# Patient Record
Sex: MALE | NOT HISPANIC OR LATINO | URBAN - METROPOLITAN AREA
[De-identification: names, ages, dates, MRNs, and addresses within clinical notes are randomized per-mention and may not be internally consistent; named-entity substitution may affect disease eponyms.]

---

## 2019-06-21 ENCOUNTER — OFFICE VISIT CONVERTED (OUTPATIENT)
Dept: UROLOGY | Facility: CLINIC | Age: 49
End: 2019-06-21
Attending: NURSE PRACTITIONER

## 2019-06-21 ENCOUNTER — CONVERSION ENCOUNTER (OUTPATIENT)
Dept: UROLOGY | Facility: CLINIC | Age: 49
End: 2019-06-21

## 2019-06-21 ENCOUNTER — HOSPITAL ENCOUNTER (OUTPATIENT)
Dept: UROLOGY | Facility: CLINIC | Age: 49
Discharge: HOME | End: 2019-06-21
Attending: NURSE PRACTITIONER

## 2019-06-22 LAB
C TRACH RRNA CVX QL NAA+PROBE: NOT DETECTED
N GONORRHOEA DNA SPEC QL NAA+PROBE: NOT DETECTED

## 2019-07-08 ENCOUNTER — OFFICE VISIT CONVERTED (OUTPATIENT)
Dept: UROLOGY | Facility: CLINIC | Age: 49
End: 2019-07-08
Attending: NURSE PRACTITIONER

## 2019-07-08 ENCOUNTER — CONVERSION ENCOUNTER (OUTPATIENT)
Dept: UROLOGY | Facility: CLINIC | Age: 49
End: 2019-07-08

## 2019-07-31 ENCOUNTER — OFFICE VISIT CONVERTED (OUTPATIENT)
Dept: UROLOGY | Facility: CLINIC | Age: 49
End: 2019-07-31
Attending: UROLOGY

## 2020-04-20 ENCOUNTER — TELEMEDICINE CONVERTED (OUTPATIENT)
Dept: NEUROSURGERY | Facility: CLINIC | Age: 50
End: 2020-04-20
Attending: PHYSICIAN ASSISTANT

## 2020-05-27 ENCOUNTER — HOSPITAL ENCOUNTER (OUTPATIENT)
Dept: GENERAL RADIOLOGY | Facility: HOSPITAL | Age: 50
Discharge: HOME | End: 2020-05-27
Attending: PHYSICIAN ASSISTANT

## 2020-06-01 ENCOUNTER — TELEMEDICINE CONVERTED (OUTPATIENT)
Dept: NEUROSURGERY | Facility: CLINIC | Age: 50
End: 2020-06-01
Attending: PHYSICIAN ASSISTANT

## 2020-07-09 ENCOUNTER — OFFICE VISIT CONVERTED (OUTPATIENT)
Dept: NEUROSURGERY | Facility: CLINIC | Age: 50
End: 2020-07-09
Attending: NEUROLOGICAL SURGERY

## 2020-12-28 ENCOUNTER — OFFICE VISIT CONVERTED (OUTPATIENT)
Dept: UROLOGY | Facility: CLINIC | Age: 50
End: 2020-12-28
Attending: UROLOGY

## 2020-12-28 ENCOUNTER — HOSPITAL ENCOUNTER (OUTPATIENT)
Dept: GENERAL RADIOLOGY | Facility: HOSPITAL | Age: 50
Discharge: HOME | End: 2020-12-28
Attending: UROLOGY

## 2021-02-05 ENCOUNTER — HOSPITAL ENCOUNTER (OUTPATIENT)
Dept: GENERAL RADIOLOGY | Facility: HOSPITAL | Age: 51
Discharge: HOME | End: 2021-02-05
Attending: UROLOGY

## 2021-02-09 ENCOUNTER — TELEPHONE CONVERTED (OUTPATIENT)
Dept: UROLOGY | Facility: CLINIC | Age: 51
End: 2021-02-09
Attending: UROLOGY

## 2021-02-24 ENCOUNTER — HOSPITAL ENCOUNTER (OUTPATIENT)
Dept: GENERAL RADIOLOGY | Facility: HOSPITAL | Age: 51
Discharge: HOME | End: 2021-02-24
Attending: UROLOGY

## 2021-03-02 ENCOUNTER — TELEPHONE CONVERTED (OUTPATIENT)
Dept: UROLOGY | Facility: CLINIC | Age: 51
End: 2021-03-02
Attending: UROLOGY

## 2021-05-10 NOTE — H&P
History and Physical      Patient Name: Chago Riddle   Patient ID: [id number]   Sex: Male   YOB: 1970    Primary Care Provider: Armand Loomis MD   Referring Provider: Armand Loomis MD    Visit Date: April 20, 2020    Provider: Sandy Richards PA-C   Location: [facility]   Location Address: [address] [place] [address]  [id number]   Location Phone: [phone]          Chief Complaint  · Back pain      History Of Present Illness  Video Conferencing Visit  Chago Riddle is a 49 year old male who is presenting for evaluation via video conferencing. Verbal consent obtained before beginning visit.   The following staff were present during this visit: Ayala Wisdom MA   HOW IS THE PATIENT PRESENTING TODAY of low back pain, right leg pain, and paresthesias in the left leg.   The right leg pain involves the right posterior thigh in a nonspecific distribution and into groin/testicular area. The pain developed gradually about one year ago. It is 5/10 in severity has an aching, a dull, a sharp, a pressure-like, and a throbbing quality and radiates into the right hip, groin distribution. The pain has been waxing and waning in severity. The pain tends to be maximal at no specific time, but waxes and wanes in severity throughout the day. The patient states the pain is aggravated by walking long distances and lying on side. It is alleviated by rest, heat, and squatting, applying pressure to back. The paresthesias involve the left S1 distribution.   He denies bowel or bladder dysfunction. The patient's past medical history is notable for prior lumbar spine surgery. He underwent a discectomy 6 years ago by Dr. Rod.   RECENT INTERVENTIONS:  He has been previously treated with NSAIDs and bedrest.   INFORMATION REVIEWED:  The following information was reviewed: radiology reports and images. CT of the lumbar spine revealed grade I spondylolisthesis and degenerative disk disease. The grade  I spondylolisthesis involves the L5 vertebral body and with bilateral pars defects at L5.. The degenerative disc disease is present at multiple levels.       Past Medical History  Degenerative Disc Disease ; Herniated Disc; Lumbago; Testicular pain; Wrist injury         Past Surgical History  Appendectomy; Back surgery; [place] Tooth Extraction; Wrist Surgery         Allergy List  NO KNOWN DRUG ALLERGIES         Family Medical History  Heart Disease; Cancer, Unspecified; Brain tumor         Social History  Alcohol (Never); Tobacco (Current every day)         Review of Systems  · Constitutional  o Denies  o : chills, excessive sweating, fatigue, fever, sycope/passing out, weight gain, weight loss  · Eyes  o Denies  o : changes in vision, blurry vision, double vision  · HENT  o Denies  o : loss of hearing, ringing in the ears, ear aches, sore throat, nasal congestion, sinus pain, nose bleeds, seasonal allergies  · Cardiovascular  o Denies  o : blood clots, swollen legs, anemia, easy burising or bleeding, transfusions  · Respiratory  o Denies  o : shortness of breath, dry cough, productive cough, pneumonia, COPD  · Gastrointestinal  o Denies  o : difficulty swallowing, reflux  · Genitourinary  o Denies  o : incontinence  · Neurologic  o Denies  o : headache, seizure, stroke, tremor, loss of balance, falls, dizziness/vertigo, difficulty with sleep, numbness/tingling/paresthesia , difficulty with coordination, difficulty with dexterity, weakness  · Musculoskeletal  o Admits  o : low back pain  o Denies  o : neck stiffness/pain, swollen lymph nodes, muscle aches, joint pain, weakness, spasms, sciatica, pain radiating in arm, pain radiating in leg  · Endocrine  o Denies  o : diabetes, thyroid disorder  · Psychiatric  o Denies  o : anxiety, depression      Physical Examination  · Constitutional  o Appearance  o : well-nourished, well developed, alert, in no acute distress  · Respiratory  o Respiratory Effort  o : breathing  unlabored  · Cardiovascular  o Peripheral Vascular System  o :   § Extremities  § : no edema or cyanosis  · Neurologic  o Mental Status Examination  o :   § Orientation  § : alert and oriented to time, person, place and events  o Reflexes  o : DTRs 2 bilaterally  o Gait and Station  o :   § Gait Screening  § : limping gait. Able to stand without difficulty.   · Psychiatric  o Mood and Affect  o : mood normal, affect appropriate     Straight leg raise negative bilaterally.               Assessment  · Lumbago/low back pain     724.3/M54.40  · Sciatica     724.3/M54.30  · Spondylolisthesis , lumbar     738.4/M43.16    Problems Reconciled  Plan  · Orders  o ACO-17: Screened for tobacco use AND received tobacco cessation intervention (4004F) - - 04/20/2020  o MRI lumbar spine w/w/o contrst (50177) - 724.3/M54.30, 724.3/M54.40, 738.4/M43.16 - 04/20/2020  o X-ray of lumbar spine, two or three bending views (19435) - 724.3/M54.30, 724.3/M54.40, 738.4/M43.16 - 04/20/2020  · Medications  o Medications have been Reconciled  o Transition of Care or Provider Policy  · Instructions  o Tobacco Cessation Counseling: Encouraged to stop smoking.   o Encouraged to follow-up with Primary Care Provider for preventative care.  o The ROS and the PFSH were reviewed at today's visit.  o I have discussed the risks and benefits of surgery versus physical therapy, epidural steroids, and other conservative forms of treatment.  o Handouts provided: Non-Surgical Treatments for Back Pain/Degenerative Disc Disease.  o We have discussed the benefits of core strengthening. Patient will work on improving the core muscle strength with low impact activities such as walking, swimming, Pilates, etc.   o Call or return to office if symptoms worsen or persist.  o Will order MRI of Lspine and bending xray of Lspine and return afterwards.   o Electronically Identified Patient Education Materials Provided Electronically  · Disposition  o Call or Return if  symptoms worsen or persist.            Electronically Signed by: Sandy Richards PA-C -Author on April 20, 2020 02:25:46 PM

## 2021-05-10 NOTE — H&P
History and Physical      Patient Name: Chago Riddle   Patient ID: [id number]   Sex: Male   YOB: 1970    Primary Care Provider: Armand Loomis MD   Referring Provider: Armand Loomis MD    Visit Date: December 28, 2020    Provider: Andres Hamilton MD   Location: [facility]   Location Address: [address] [place] [address]  [id number]   Location Phone: [phone]          Chief Complaint  · pt is having urological issues      History Of Present Illness     50-year-old gentleman with a left 5 mm ureteral stone chronic right testicular/groin pain    Has been straining his urine. Has not a stone.  Patient has not had pain for 2 days.  Patient has not had severe pain for several days.  Makes the pain better or worse.  Pain is sharp in nature.    12/28/20 negative    Patient has had some right-sided testicular pain for the last year. Bothersome.  HAs has a lot of lumbar back pain.  [facility] Hard to [facility] sometimes.    12/20 CT abdomen/pelvis without5 mm UVJ stone, very distal on the left.  No other stones in the left.  4 mm nonobstructing stone the right.  No other findings.    1st stone           Past Medical History  Degenerative Disc Disease ; Herniated Disc; Kidney stone; Lumbago; Testicular pain; Wrist injury         Past Surgical History  Appendectomy; Back surgery; [place] Tooth Extraction; Wrist Surgery         Allergy List  NO KNOWN DRUG ALLERGIES         Family Medical History  Heart Disease; Cancer, Unspecified; Brain tumor         Social History  Alcohol (Never); Tobacco (Current every day)         Review of Systems  · Constitutional  o Denies  o : fever, headache, chills  · Eyes  o Denies  o : eye pain, double vision, blurred vision  · HENT  o Denies  o : sinus problems, sore throat, ear infection  · Cardiovascular  o Denies  o : chest pain, high blood pressure, varicosities  · Respiratory  o Denies  o : shortness of breath,  "wheezing, frequent cough  · Gastrointestinal  o Denies  o : nausea, vomiting, heartburn, indigestion, abdominal pain  · Genitourinary  o Admits  o : frequency  o Denies  o : urgency, urinary retention, painful urination  · Integument  o Denies  o : rash, itching, boils  · Neurologic  o Denies  o : tingling or numbness, tremors, dizzy spells  · Musculoskeletal  o Denies  o : joint pain, neck pain, back pain  · Endocrine  o Denies  o : cold intolerance, heat intolerance, tired, excessive thirst, sluggish  · Psychiatric  o Admits  o : feels satisfied with life  o Denies  o : severe depression, concerns with hurting themselves  · Heme-Lymph  o Denies  o : swollen glands, blood clotting problems  · Allergic-Immunologic  o Denies  o : sinus allergy symptoms, hay fever      Vitals  Date Time BP Position Site L\R Cuff Size HR RR TEMP (F) WT  HT  BMI kg/m2 BSA m2 O2 Sat FR L/min FiO2        12/28/2020 11:31 AM       13  185lbs 16oz 5'  10\" 26.69 2.04             Physical Examination  · Constitutional  o Appearance  o : Well-appearing, well-developed, in no acute distress  · Respiratory  o Respiratory Effort  o : Unlabored breathing  · Cardiovascular  o Heart  o :   § Auscultation of Heart  § : Regular rate and rhythm, no murmurs  · Gastrointestinal  o Abdominal Examination  o : Nontender, nondistended, no rigidity or guarding, no hepatosplenomegaly  · Genitourinary  o Bladder  o : nonpalpable  o Penis  o : circumcised phallus with no masses or lesions  o Urethral Meatus  o : no inflammation present and no stenosis  o Scrotum and Scrotal Contents  o :   § Testes  § : Bilateral descended testicles no masses or lesions  · Neurologic  o Mental Status Examination  o :   § Orientation  § : Grossly oriented to person, place and time, judgment and insight intact, normal mood and affect              Assessment  · Nephrolithiasis     592.0/N20.0  · Testicular pain, right     608.9/N50.811      Plan  · Orders  o Renal ultrasound " (20154, 55190) - 592.0/N20.0 - 02/05/2021 2-5-21 AT 10:45 AT [facility] - COOL SRINGS - HAVE FULL BLADDER  · Medications  o Medications have been Reconciled  o Transition of Care or Provider Policy  · Instructions  o Electronically Identified Patient Education Materials Provided Electronically       Ureteral stone    Has not had pain for a few days.  At this time we shorty continue to try to let him pass his stones if it still there.    Follow-up in 1 month with renal ultrasound, patient understands if he gets fever greater than 100.5, intractable nausea/vomiting or tract pain he should go back to the ER      Right testicular pain    Currently being worked up by neurosurgery.  We discussed that this pain is continuing to bother him after this we could discuss further interventions.  We did discuss NSAIDs or antibiotics.  At this time he is not interested.  PCP has already tried some forms of treatment.    Follow-up in 1 month with renal ultrasound    Greater than 15 minutes was used in counseling and coordination of care, with greater than 51% of this in face-to-face counseling             Electronically Signed by: Andres Hamilton MD -Author on December 28, 2020 12:08:17 PM

## 2021-05-13 NOTE — PROGRESS NOTES
Progress Note      Patient Name: Chago Riddle   Patient ID: [id number]   Sex: Male   YOB: 1970    Primary Care Provider: Armand Loomis MD   Referring Provider: Armand Loomis MD    Visit Date: July 9, 2020    Provider: Burak Rod MD   Location: [facility]   Location Address: [address] [place] [address]  [id number]   Location Phone: [phone]          Chief Complaint  · Follow-up     Here with complaints of right hip pain radiating to right groin. Did not complete xrays ordered by Sugey at last visit.       History Of Present Illness  The patient is a 49 year old male who is in the office for followup appointment. His worst pain is in the right hip and testicle. He has had his testicle evaluated and has had an MRI of the lumbar spine. He has not yet had flex-ext or hip x-rays.       Past Medical History  Degenerative Disc Disease ; Herniated Disc; Lumbago; Testicular pain; Wrist injury         Past Surgical History  Appendectomy; Back surgery; [place] Tooth Extraction; Wrist Surgery         Allergy List  NO KNOWN DRUG ALLERGIES       Allergies Reconciled  Family Medical History  Heart Disease; Cancer, Unspecified; Brain tumor         Social History  Alcohol (Never); Tobacco (Current every day)         Review of Systems  · Constitutional  o Admits  o : weight gain  o Denies  o : chills, excessive sweating, fatigue, fever, sycope/passing out, weight loss  · Eyes  o Admits  o : changes in vision, blurry vision  o Denies  o : double vision  · HENT  o Admits  o : seasonal allergies  o Denies  o : loss of hearing, ringing in the ears, ear aches, sore throat, nasal congestion, sinus pain, nose bleeds  · Cardiovascular  o Denies  o : blood clots, swollen legs, anemia, easy burising or bleeding, transfusions  · Respiratory  o Denies  o : shortness of breath, dry cough, productive cough, pneumonia, COPD  · Gastrointestinal  o Denies  o : difficulty swallowing,  "reflux  · Genitourinary  o Denies  o : incontinence  · Neurologic  o Admits  o : difficulty with sleep, numbness/tingling/paresthesia   o Denies  o : headache, seizure, stroke, tremor, loss of balance, falls, dizziness/vertigo, difficulty with coordination, difficulty with dexterity, weakness  · Musculoskeletal  o Admits  o : muscle aches, joint pain, spasms, pain radiating in leg, low back pain  o Denies  o : neck stiffness/pain, swollen lymph nodes, weakness, sciatica, pain radiating in arm  · Endocrine  o Denies  o : diabetes, thyroid disorder  · Psychiatric  o Denies  o : anxiety, depression  · All Others Negative      Vitals  Date Time BP Position Site L\R Cuff Size HR RR TEMP (F) WT  HT  BMI kg/m2 BSA m2 O2 Sat HC       07/09/2020 11:28 AM        97.1 186lbs 8oz 5'  10\" 26.76 2.04           Physical Examination  · Constitutional  o Appearance  o : well-nourished, well developed, alert, in no acute distress  · Respiratory  o Respiratory Effort  o : breathing unlabored  · Cardiovascular  o Peripheral Vascular System  o :   § Extremities  § : no cyanosis, clubbing or edema  · Psychiatric  o Mood and Affect  o : mood normal, affect appropriate     He has mild pain with Ghulam's testing.               Assessment  · Right groin pain     789.03/R10.31  We will r/o hip as cause  · Spondylolisthesis at L5-S1 level     756.12/M43.17  Chronic  · Lumbar spondylosis     721.3/M47.816  L3-4 lateral recess stenosis on the right    Problems Reconciled  Plan  · Orders  o X-ray of lumbar spine, two or three bending views (39792) - - 07/09/2020  o Hip xray right 2 or more views (includes AP Pelvis) [facility]. (10829) - - 07/09/2020  · Medications  o Medications have been Reconciled  o Transition of Care or Provider Policy  · Instructions  o He will get his flex-extension x-rays as well as right hip x-rays. If no clear cause, we will consider a right L3-4 lateral recess decompression (redo from previous " discectomy).            Electronically Signed by: Burak Rod MD -Author on July 9, 2020 12:39:21 PM

## 2021-05-13 NOTE — PROGRESS NOTES
Progress Note      Patient Name: Chago Riddle   Patient ID: [id number]   Sex: Male   YOB: 1970    Primary Care Provider: Armand Loomis MD   Referring Provider: Armand Loomis MD    Visit Date: June 1, 2020    Provider: Sandy Richards PA-C   Location: [facility]   Location Address: [address] [place] [address]  [id number]   Location Phone: [phone]          Chief Complaint     Patient is doing a telehealth visit to discuss MRI results.       History Of Present Illness  The patient is a 49 year old male who is in the office for followup appointment. MRI of Lspine showed grade 1 spondylolisthesis with pars defects at L5. New right L3/4 disc osteophyte. Laminectomy defect at L3/4. He continues to have right groin pain and left leg numbness. He notes that his symptoms have worsened over the past year. He has failed LESIs. This was a video conference on zoom.       Past Medical History  Degenerative Disc Disease ; Herniated Disc; Lumbago; Testicular pain; Wrist injury         Past Surgical History  Appendectomy; Back surgery; [place] Tooth Extraction; Wrist Surgery         Allergy List  NO KNOWN DRUG ALLERGIES         Family Medical History  Heart Disease; Cancer, Unspecified; Brain tumor         Social History  Alcohol (Never); Tobacco (Current every day)         Review of Systems  · Constitutional  o Denies  o : chills, excessive sweating, fatigue, fever, sycope/passing out, weight gain, weight loss  · Eyes  o Denies  o : changes in vision, blurry vision, double vision  · HENT  o Denies  o : loss of hearing, ringing in the ears, ear aches, sore throat, nasal congestion, sinus pain, nose bleeds, seasonal allergies  · Cardiovascular  o Denies  o : blood clots, swollen legs, anemia, easy burising or bleeding, transfusions  · Respiratory  o Denies  o : shortness of breath, dry cough, productive cough, pneumonia, COPD  · Gastrointestinal  o Denies  o : difficulty swallowing,  reflux  · Genitourinary  o Denies  o : incontinence  · Neurologic  o Denies  o : headache, seizure, stroke, tremor, loss of balance, falls, dizziness/vertigo, difficulty with sleep, numbness/tingling/paresthesia , difficulty with coordination, difficulty with dexterity, weakness  · Musculoskeletal  o Admits  o : low back pain  o Denies  o : neck stiffness/pain, swollen lymph nodes, muscle aches, joint pain, weakness, spasms, sciatica, pain radiating in arm, pain radiating in leg  · Endocrine  o Denies  o : diabetes, thyroid disorder  · Psychiatric  o Denies  o : anxiety, depression      Physical Examination  · Constitutional  o Appearance  o : well-nourished, well developed, alert, in no acute distress  · Respiratory  o Respiratory Effort  o : breathing unlabored  · Cardiovascular  o Peripheral Vascular System  o :   § Extremities  § : no edema or cyanosis  · Neurologic  o Mental Status Examination  o :   § Orientation  § : alert and oriented to time, person, place and events  o Reflexes  o : DTRs 2 bilaterally  o Gait and Station  o :   § Gait Screening  § : limping gait. Able to stand without difficulty.   · Psychiatric  o Mood and Affect  o : mood normal, affect appropriate     Straight leg raise negative bilaterally. Pain with bending forward and backwards.               Assessment  · Lumbago/low back pain     724.3/M54.40  · Paresthesia     782.0/R20.2  · Sciatica     724.3/M54.30  · Spondylolisthesis , lumbar     738.4/M43.16      Plan  · Orders  o X-ray of lumbar spine, two or three bending views (32235) - 724.3/M54.30, 724.3/M54.40, 738.4/M43.16 - 06/01/2020  · Medications  o Medications have been Reconciled  o Transition of Care or Provider Policy  · Instructions  o The ROS and the PFSH were reviewed at today's visit.  o Call or return to office if symptoms worsen or persist.   o Has had previous surgery with Dr. Rod. Having worsening pain. Will order bending xray and return to discuss options. Has  spondy at L5 with pars defects.   o Electronically Identified Patient Education Materials Provided Electronically  · Disposition  o Call or Return if symptoms worsen or persist.            Electronically Signed by: Sandy Richards PA-C -Author on June 8, 2020 02:40:30 PM

## 2021-05-14 VITALS — WEIGHT: 186 LBS | RESPIRATION RATE: 13 BRPM | HEIGHT: 70 IN | BODY MASS INDEX: 26.63 KG/M2

## 2021-05-14 NOTE — PROGRESS NOTES
Progress Note      Patient Name: Chago Riddle   Patient ID: [id number]   Sex: Male   YOB: 1970    Primary Care Provider: Armand Loomis MD   Referring Provider: Armand Loomis MD    Visit Date: March 2, 2021    Provider: Andres Hamilton MD   Location: [facility]   Location Address: [address] [place] [address]  [id number]   Location Phone: [phone]          Chief Complaint  · urological issues      History Of Present Illness  TELEHEALTH TELEPHONE VISIT  Chago Riddle is a 50 year old /White male who is presenting for evaluation via telehealth telephone visit. Verbal consent obtained before beginning visit.   Provider spent 13 minutes with the patient during the telehealth visit.   The following staff were present during this visit: SOFIA Contreras   Past Medical History/ Overview of Patient Symptoms     50-year-old gentleman with h/o a left 5 mm ureteral stone and chronic right testicular/groin pain    f/u today with pain.    No L flank pain.  Pain in right scrotum/testicle. Chronic for years.  Pain also in R buttocks also at times, very bothersome.     f/u today with CT    2/21  CT A/P -2 mm stone in the right kidney, no other stones.  2 cm long segment of the ascending colon with circumferential wall thickening    No narcotics    Previous    2/21 renal ultrasound -mild right-sided pelviectasis, L side neg    Patient has had some right-sided testicular pain for the last year. Bothersome.  HAs has a lot of lumbar back pain.  Dr Rod has followed, being worked up cur[facility]. Hard to walk sometimes.    12/20 CT abdomen/pelvis without - 5 mm UVJ stone, very distal on the left.  No other stones in the left.  4 mm nonobstructing stone the right.  No other findings.    1st stone           Past Medical History  Degenerative Disc Disease ; Herniated Disc; Kidney stone; Lumbago; Testicular pain; Wrist injury         Past Surgical History  Appendectomy; Back surgery; [place]  Tooth Extraction; Wrist Surgery         Allergy List  NO KNOWN DRUG ALLERGIES         Family Medical History  Heart Disease; Cancer, Unspecified; Brain tumor         Social History  Alcohol (Never); Tobacco (Current every day)         Review of Systems  · Constitutional  o Denies  o : fatigue, night sweats  · Eyes  o Denies  o : double vision, blurred vision  · HENT  o Denies  o : vertigo, recent head injury  · Breasts  o Denies  o : abnormal changes in breast size, additional breast symptoms except as noted in the HPI  · Cardiovascular  o Denies  o : chest pain, irregular heart beats  · Respiratory  o Denies  o : shortness of breath, productive cough  · Gastrointestinal  o Denies  o : nausea, vomiting  · Genitourinary  o Denies  o : dysuria, urinary retention  · Integument  o Denies  o : hair growth change, new skin lesions  · Neurologic  o Denies  o : altered mental status, seizures  · Musculoskeletal  o Denies  o : joint swelling, limitation of motion  · Endocrine  o Denies  o : cold intolerance, heat intolerance  · Heme-Lymph  o Denies  o : petechiae, lymph node enlargement or tenderness  · Allergic-Immunologic  o Denies  o : frequent illnesses          Assessment  · Nephrolithiasis     592.0/N20.0  · Testicular pain, right     608.9/N50.811      Plan  · Orders  o Physician Telephone Evaluation, 11-20 minutes (38046) - 592.0/N20.0, 608.9/N50.811 - 03/02/2021  · Medications  o Medications have been Reconciled  o Transition of Care or Provider Policy  · Instructions  o Electronically Identified Patient Education Materials Provided Electronically     Patient has passed his stone, no pain on the left any longer    Abnormal area ascending colon    I did recommend because patient is 49 has this area abnormal on CT he get a colonoscopy.  We did discuss failure to do this could risk is a malignancy which could be detrimental to his health or cause death.  I will get him set up to see general surgery    Right scrotal  pain    I did discuss with patient considering spermatic cord block to see if this helps  - after discussion of risk /benefits patient does not want this at this time.  Is going to follow-up with Dr. oRd and see if he needs back surgery       Patient will follow me as needed.             Electronically Signed by: Andres Hamilton MD -Author on March 2, 2021 05:20:16 PM

## 2021-05-14 NOTE — PROGRESS NOTES
Progress Note      Patient Name: Chago Riddle   Patient ID: [id number]   Sex: Male   YOB: 1970    Primary Care Provider: Armand Loomis MD   Referring Provider: Armand Loomis MD    Visit Date: February 9, 2021    Provider: Andres Hamilton MD   Location: [facility]   Location Address: [address] [place] [address]  [id number]   Location Phone: [phone]          Chief Complaint  · urological issues      History Of Present Illness  TELEHEALTH TELEPHONE VISIT  Chago Riddle is a 50 year old /White male who is presenting for evaluation via telehealth telephone visit. Verbal consent obtained before beginning visit.   Provider spent 12 minutes with the patient during the telehealth visit.   The following staff were present during this visit: SOFIA Contreras   Past Medical History/ Overview of Patient Symptoms     50-year-old gentleman with h/o a left 5 mm ureteral stone and chronic right testicular/groin pain    2/21 renal ultrasound -mild right-sided pelviectasis, L side neg    No pain on the L, No GH. Pt still with chronic pain on the right scrotum/testicle. All the left pain is gone    No f/c.    Previous    Patient has had some right-sided testicular pain for the last year. Bothersome.  HAs has a lot of lumbar back pain.  Dr Rod has followed, being worked up currenlty. Hard to walk sometimes.    12/20 CT abdomen/pelvis without - 5 mm UVJ stone, very distal on the left.  No other stones in the left.  4 mm nonobstructing stone the right.  No other findings.    1st stone           Past Medical History  Degenerative Disc Disease ; Herniated Disc; Kidney stone; Lumbago; Testicular pain; Wrist injury         Past Surgical History  Appendectomy; Back surgery; Upper Tract Tooth Extraction; Wrist Surgery         Allergy List  NO KNOWN DRUG ALLERGIES         Family Medical History  Heart Disease; Cancer, Unspecified; Brain tumor         Social History  Alcohol (Never); Tobacco  (Current every day)         Review of Systems  · Constitutional  o Denies  o : fatigue, night sweats  · Eyes  o Denies  o : double vision, blurred vision  · HENT  o Denies  o : vertigo, recent head injury  · Breasts  o Denies  o : abnormal changes in breast size, additional breast symptoms except as noted in the HPI  · Cardiovascular  o Denies  o : chest pain, irregular heart beats  · Respiratory  o Denies  o : shortness of breath, productive cough  · Gastrointestinal  o Denies  o : nausea, vomiting  · Genitourinary  o Admits  o : frequency, scrotal pain  o Denies  o : dysuria, urinary retention  · Integument  o Denies  o : hair growth change, new skin lesions  · Neurologic  o Denies  o : altered mental status, seizures  · Musculoskeletal  o Denies  o : joint swelling, limitation of motion  · Endocrine  o Denies  o : cold intolerance, heat intolerance  · Heme-Lymph  o Denies  o : petechiae, lymph node enlargement or tenderness  · Allergic-Immunologic  o Denies  o : frequent illnesses          Assessment  · Nephrolithiasis     592.0/N20.0  · Testicular pain, right     608.9/N50.811      Plan  · Orders  o Physician Telephone Evaluation, 11-20 minutes (81582) - 592.0/N20.0, 608.9/N50.811 - 02/09/2021  o CT Abdomen and Pelvis without IV Contrast [facility]; suggest oral prep (allergic--drink Readicat; not allegic but with renal failure--drink Omnipaque) (26489) - 592.0/N20.0, 608.9/N50.811 - 02/15/2021   no oral prep  · Medications  o Medications have been Reconciled  o Transition of Care or Provider Policy  · Instructions  o Plan Of Care:   o Electronically Identified Patient Education Materials Provided Electronically       Patient no longer having pain on the left but still having quite a bit of pain on the right.  His recent renal ultrasound showed no hydro on the left.  We discussed at this time the stone in the left hopefully is gone,but we cannot know this for sure.  The hydronephrosis on the right has unknown etiology  at this time.  I discussed he could be passing that stone that was previously nonobstructing or some other issue.  Because he still having quite a bit of pain on the right after discussion will get a CT abdomen/pelvis without contrast having follow-up after             Electronically Signed by: Andres Hamilton MD -Author on February 14, 2021 02:53:20 PM

## 2021-05-15 VITALS
SYSTOLIC BLOOD PRESSURE: 121 MMHG | HEART RATE: 107 BPM | HEIGHT: 70 IN | DIASTOLIC BLOOD PRESSURE: 79 MMHG | BODY MASS INDEX: 24.05 KG/M2 | WEIGHT: 168 LBS | TEMPERATURE: 98.4 F

## 2021-05-15 VITALS
SYSTOLIC BLOOD PRESSURE: 97 MMHG | DIASTOLIC BLOOD PRESSURE: 71 MMHG | WEIGHT: 168 LBS | TEMPERATURE: 98.3 F | HEIGHT: 70 IN | HEART RATE: 102 BPM | BODY MASS INDEX: 24.05 KG/M2

## 2021-05-15 VITALS — WEIGHT: 186.5 LBS | HEIGHT: 70 IN | TEMPERATURE: 97.1 F | BODY MASS INDEX: 26.7 KG/M2

## 2021-05-15 VITALS
DIASTOLIC BLOOD PRESSURE: 74 MMHG | HEART RATE: 96 BPM | HEIGHT: 70 IN | TEMPERATURE: 98.3 F | WEIGHT: 168 LBS | SYSTOLIC BLOOD PRESSURE: 110 MMHG | BODY MASS INDEX: 24.05 KG/M2

## 2023-05-21 ENCOUNTER — APPOINTMENT (OUTPATIENT)
Dept: GENERAL RADIOLOGY | Facility: HOSPITAL | Age: 53
End: 2023-05-21
Payer: COMMERCIAL

## 2023-05-21 ENCOUNTER — HOSPITAL ENCOUNTER (EMERGENCY)
Facility: HOSPITAL | Age: 53
Discharge: HOME | End: 2023-05-21
Attending: EMERGENCY MEDICINE
Payer: COMMERCIAL

## 2023-05-21 VITALS
SYSTOLIC BLOOD PRESSURE: 129 MMHG | TEMPERATURE: 97.9 F | RESPIRATION RATE: 18 BRPM | HEART RATE: 97 BPM | DIASTOLIC BLOOD PRESSURE: 91 MMHG | WEIGHT: 186.51 LBS | OXYGEN SATURATION: 97 % | HEIGHT: 70 IN | BODY MASS INDEX: 26.7 KG/M2

## 2023-05-21 DIAGNOSIS — M47.812 OSTEOARTHRITIS OF CERVICAL SPINE, UNSPECIFIED SPINAL OSTEOARTHRITIS COMPLICATION STATUS: ICD-10-CM

## 2023-05-21 DIAGNOSIS — M54.2 NECK PAIN: Primary | ICD-10-CM

## 2023-05-21 PROCEDURE — 25010000002 KETOROLAC TROMETHAMINE PER 15 MG: Performed by: NURSE PRACTITIONER

## 2023-05-21 PROCEDURE — 72050 X-RAY EXAM NECK SPINE 4/5VWS: CPT

## 2023-05-21 PROCEDURE — 96372 THER/PROPH/DIAG INJ SC/IM: CPT

## 2023-05-21 PROCEDURE — 25010000002 DEXAMETHASONE SODIUM PHOSPHATE 10 MG/ML SOLUTION: Performed by: NURSE PRACTITIONER

## 2023-05-21 PROCEDURE — 99283 EMERGENCY DEPT VISIT LOW MDM: CPT

## 2023-05-21 RX ORDER — KETOROLAC TROMETHAMINE 10 MG/1
10 TABLET, FILM COATED ORAL EVERY 6 HOURS PRN
Qty: 20 TABLET | Refills: 0 | Status: SHIPPED | OUTPATIENT
Start: 2023-05-21

## 2023-05-21 RX ORDER — PREDNISONE 10 MG/1
20 TABLET ORAL DAILY
Qty: 5 TABLET | Refills: 0 | Status: SHIPPED | OUTPATIENT
Start: 2023-05-21

## 2023-05-21 RX ORDER — CYCLOBENZAPRINE HCL 10 MG
10 TABLET ORAL 3 TIMES DAILY PRN
Qty: 30 TABLET | Refills: 0 | Status: SHIPPED | OUTPATIENT
Start: 2023-05-21

## 2023-05-21 RX ORDER — DEXAMETHASONE SODIUM PHOSPHATE 10 MG/ML
10 INJECTION, SOLUTION INTRAMUSCULAR; INTRAVENOUS ONCE
Status: COMPLETED | OUTPATIENT
Start: 2023-05-21 | End: 2023-05-21

## 2023-05-21 RX ORDER — KETOROLAC TROMETHAMINE 30 MG/ML
30 INJECTION, SOLUTION INTRAMUSCULAR; INTRAVENOUS ONCE
Status: COMPLETED | OUTPATIENT
Start: 2023-05-21 | End: 2023-05-21

## 2023-05-21 RX ADMIN — KETOROLAC TROMETHAMINE 30 MG: 30 INJECTION, SOLUTION INTRAMUSCULAR; INTRAVENOUS at 14:48

## 2023-05-21 RX ADMIN — DEXAMETHASONE SODIUM PHOSPHATE 10 MG: 10 INJECTION INTRAMUSCULAR; INTRAVENOUS at 14:48

## 2023-05-21 NOTE — ED PROVIDER NOTES
"Time: 2:42 PM EDT  Date of encounter:  5/21/2023  Independent Historian/Clinical History and Information was obtained by:   Patient  Chief Complaint: neck pain    History is limited by: N/A    History of Present Illness:  Patient is a 52 y.o. year old male who presents to the emergency department for evaluation of right-sided neck pain that started yesterday and has gotten progressively worse.  He has taken Excedrin.      HPI    Patient Care Team  Primary Care Provider: Provider, No Known    Past Medical History:     No Known Allergies  History reviewed. No pertinent past medical history.  Past Surgical History:   Procedure Laterality Date   • BACK SURGERY       History reviewed. No pertinent family history.    Home Medications:  Prior to Admission medications    Not on File        Social History:   Social History     Tobacco Use   • Smoking status: Never   Substance Use Topics   • Alcohol use: Not Currently         Review of Systems:  Review of Systems   Constitutional: Negative for chills and fever.   Gastrointestinal: Negative for nausea and vomiting.   Musculoskeletal: Positive for neck pain.        Physical Exam:  /91   Pulse 97   Temp 97.9 °F (36.6 °C) (Oral)   Resp 18   Ht 177.8 cm (70\")   Wt 84.6 kg (186 lb 8.2 oz)   SpO2 97%   BMI 26.76 kg/m²     Physical Exam  Constitutional:       General: He is in acute distress (due to pain).      Appearance: Normal appearance. He is not ill-appearing or toxic-appearing.   Cardiovascular:      Rate and Rhythm: Normal rate.   Pulmonary:      Effort: Pulmonary effort is normal.   Musculoskeletal:      Cervical back: Tenderness present.   Neurological:      Mental Status: He is alert.                  Procedures:  Procedures      Medical Decision Making:      Comorbidities that affect care:    None    External Notes reviewed:    None      The following orders were placed and all results were independently analyzed by me:  Orders Placed This Encounter "   Procedures   • XR Spine Cervical Complete 4 or 5 View       Medications Given in the Emergency Department:  Medications   ketorolac (TORADOL) injection 30 mg (30 mg Intramuscular Given 5/21/23 1448)   dexamethasone sodium phosphate injection 10 mg (10 mg Intramuscular Given 5/21/23 1448)        ED Course:         Labs:    Lab Results (last 24 hours)     ** No results found for the last 24 hours. **           Imaging:    No Radiology Exams Resulted Within Past 24 Hours      Differential Diagnosis and Discussion:    Neck Pain: The patient presents with neck pain. My differential diagnosis includes but is not limited to acute spinal epidural abscess, acute spinal epidural bleed, meningitis, musculoskeletal neck pain, spinal fracture, and osteoarthritis.     All X-rays impressions were independently interpreted by me.    [facility]         Patient Care Considerations:    NARCOTICS: I considered prescribing opiate pain medication as an outpatient, however They were not indicated      Consultants/Shared Management Plan:    None    Social Determinants of Health:    Patient is independent, reliable, and has access to care.       Disposition and Care Coordination:    Discharged: The patient is suitable and stable for discharge with no need for consideration of observation or admission.    I have explained the patient´s condition, diagnoses and treatment plan based on the information available to me at this time. I have answered questions and addressed any concerns. The patient has a good  understanding of the patient´s diagnosis, condition, and treatment plan as can be expected at this point. The vital signs have been stable. The patient´s condition is stable and appropriate for discharge from the emergency department.      The patient will pursue further outpatient evaluation with the primary care physician or other designated or consulting physician as outlined in the discharge instructions. They are agreeable to this plan of  care and follow-up instructions have been explained in detail. The patient has received these instructions in written format and have expressed an understanding of the discharge instructions. The patient is aware that any significant change in condition or worsening of symptoms should prompt an immediate return to this or the closest emergency department or call to 911.    Final diagnoses:   Neck pain   Osteoarthritis of cervical spine, unspecified spinal osteoarthritis complication status        ED Disposition     ED Disposition   Discharge    Condition   Stable    Comment   --             This medical record created using voice recognition software.           Nicole Garibay, APRN  05/28/23 2066

## 2023-06-14 ENCOUNTER — OFFICE VISIT (OUTPATIENT)
Dept: NEUROSURGERY | Facility: CLINIC | Age: 53
End: 2023-06-14
Payer: COMMERCIAL

## 2023-06-14 ENCOUNTER — TELEPHONE (OUTPATIENT)
Dept: NEUROSURGERY | Facility: CLINIC | Age: 53
End: 2023-06-14
Payer: COMMERCIAL

## 2023-06-14 VITALS
DIASTOLIC BLOOD PRESSURE: 82 MMHG | BODY MASS INDEX: 28.05 KG/M2 | HEART RATE: 93 BPM | WEIGHT: 195.9 LBS | SYSTOLIC BLOOD PRESSURE: 144 MMHG | HEIGHT: 70 IN

## 2023-06-14 DIAGNOSIS — M54.12 CERVICAL RADICULOPATHY: ICD-10-CM

## 2023-06-14 DIAGNOSIS — M47.812 CERVICAL SPONDYLOSIS WITHOUT MYELOPATHY: ICD-10-CM

## 2023-06-14 DIAGNOSIS — M54.2 CERVICALGIA: Primary | ICD-10-CM

## 2023-06-14 RX ORDER — METHYLPREDNISOLONE 4 MG/1
TABLET ORAL
Qty: 21 TABLET | Refills: 0 | Status: SHIPPED | OUTPATIENT
Start: 2023-06-14

## 2023-06-14 RX ORDER — HYDROCODONE BITARTRATE AND ACETAMINOPHEN 5; 325 MG/1; MG/1
1 TABLET ORAL EVERY 8 HOURS PRN
Qty: 21 TABLET | Refills: 0 | Status: SHIPPED | OUTPATIENT
Start: 2023-06-14

## 2023-06-14 RX ORDER — BACLOFEN 10 MG/1
10 TABLET ORAL 3 TIMES DAILY
Qty: 90 TABLET | Refills: 1 | Status: SHIPPED | OUTPATIENT
Start: 2023-06-14

## 2023-06-14 RX ORDER — ACETAMINOPHEN, ASPIRIN AND CAFFEINE 250; 250; 65 MG/1; MG/1; MG/1
1 TABLET, FILM COATED ORAL EVERY 6 HOURS PRN
COMMUNITY

## 2023-06-14 NOTE — TELEPHONE ENCOUNTER
Left message for pt to return call. Due cancellation, I was going to offer earlier appt to patient, @ [phone].

## 2023-06-14 NOTE — PATIENT INSTRUCTIONS
-MRI Cervical Spine  -Medrol dospak  -Norco 5/325 mg every 8 hours as needed for pain  -Baclofen 10 mg three times daily  -Follow up in 4 weeks

## 2023-06-14 NOTE — PROGRESS NOTES
"Chief Complaint  Neck Pain (Neck pain, forward head posture)    Subjective          Chago Riddle who is a 52 y.o. year old male who presents to [facility] for evaluation of cervical spine.      The patient complains of pain located in the cervical spine.  Patients states the pain has been present for 3 weeks.  The pain came on acutely.  Pain started on the left side of the neck, progressed into the posterior region and then progressed to the right side. He went to the ED for evaluation. He had an XR Cervical Spine demonstrating multilevel spondylosis with facet arthritis. The pain scale level is 6.  The pain does radiate. Dermatomes are located on left Cervical at: C7 and C8..  The pain is waxing/waning and described as sharp, throbbing, and pressure like.  The pain is worse in the morning. Patient states head turning makes the pain worse.  Patient states rest makes the pain better.    Associated Symptoms Include: Numbness and Tingling  Conservative Interventions Include: Oral Steroids that were somewhat effective., NSAIDs that were not very effective., and Muscle Relaxants that were not very effective.    Was this the result of an injury or accident? : No    History of Previous Spinal Surgery?: Yes.  Lumbar, Date laminectomy 2010    Nicotine use: smoker  (1 ppd)    BMI: Body mass index is 28.11 kg/m².      Review of Systems   Musculoskeletal:  Positive for arthralgias, back pain, myalgias, neck pain and neck stiffness.   Neurological:  Positive for weakness and numbness.   All other systems reviewed and are negative.     Objective   Vital Signs:   /82 (BP Location: Left arm, Patient Position: Sitting, Cuff Size: Adult)   Pulse 93   Ht 177.8 cm (70\")   Wt 88.9 kg (195 lb 14.4 oz)   BMI 28.11 kg/m²       Physical Exam  Vitals reviewed.   Constitutional:       Appearance: Normal appearance.   Musculoskeletal:      Right shoulder: No tenderness. Normal range of " motion.      Left shoulder: No tenderness. Normal range of motion.      Cervical back: Rigidity and tenderness present. Pain with movement present. Decreased range of motion.   Neurological:      Mental Status: He is alert and oriented to person, place, and time.      Motor: Motor strength is normal.      Gait: Gait is intact.      Deep Tendon Reflexes:      Reflex Scores:       Tricep reflexes are 0 on the right side and 0 on the left side.       Bicep reflexes are 0 on the right side and 0 on the left side.       Brachioradialis reflexes are 0 on the right side and 0 on the left side.     Neurologic Exam     Mental Status   Oriented to person, place, and time.   Level of consciousness: alert    Motor Exam   Muscle bulk: normal  Overall muscle tone: normal    Strength   Strength 5/5 throughout.     Sensory Exam   Sensory deficit distribution on left: C7    Gait, Coordination, and Reflexes     Gait  Gait: normal    Reflexes   Right brachioradialis: 0  Left brachioradialis: 0  Right biceps: 0  Left biceps: 0  Right triceps: 0  Left triceps: 0  Right Howard: absent  Left Howard: absent     Result Review :       Data reviewed : Radiologic studies XR Cervical Spine on 5/21/23 at [facility] personally reviewed. Multilevel spondylosis with facet OA and uncovertebral joint OA.            Assessment and Plan    Diagnoses and all orders for this visit:    1. Cervicalgia (Primary)  -     MRI Cervical Spine Without Contrast; Future  -     methylPREDNISolone (MEDROL) 4 MG dose pack; Take as directed on package instructions.  Dispense: 21 tablet; Refill: 0  -     HYDROcodone-acetaminophen (NORCO) 5-325 MG per tablet; Take 1 tablet by mouth Every 8 (Eight) Hours As Needed for Severe Pain.  Dispense: 21 tablet; Refill: 0  -     baclofen (LIORESAL) 10 MG tablet; Take 1 tablet by mouth 3 (Three) Times a Day.  Dispense: 90 tablet; Refill: 1    2. Cervical spondylosis without myelopathy  -     MRI Cervical Spine Without Contrast;  Future    3. Cervical radiculopathy  -     MRI Cervical Spine Without Contrast; Future    Pt presenting for evaluation of acute neck pain with radiating pain and numbness into the left arm. He was evaluated in the ED in May, where he had an XR demonstrating multilevel spondylosis with facet arthritis. Pain has continued to progress and worsen since that time. His pain is too severe at this point to engage in physical therapy. Exam today demonstrates hyporeflexia with a C7 sensory deficit on the left. Will proceed with MRI Cervical spine to evaluate for surgical vs interventional management.     Will send in medrol dospak and start Norco and Baclofen for his pain.     He will follow up after his MRI.       Follow Up   Return in about 4 weeks (around 7/12/2023).  Patient was given instructions and counseling regarding his condition or for health maintenance advice.     -MRI Cervical Spine  -Medrol dospak  -Norco 5/325 mg every 8 hours as needed for pain  -Baclofen 10 mg three times daily  -Follow up in 4 weeks

## 2023-08-15 DIAGNOSIS — M54.2 CERVICALGIA: ICD-10-CM

## 2023-08-15 RX ORDER — BACLOFEN 10 MG/1
TABLET ORAL
Qty: 90 TABLET | Refills: 1 | Status: SHIPPED | OUTPATIENT
Start: 2023-08-15

## 2023-12-22 ENCOUNTER — PATIENT ROUNDING (BHMG ONLY) (OUTPATIENT)
Dept: FAMILY MEDICINE CLINIC | Facility: CLINIC | Age: 53
End: 2023-12-22
Payer: COMMERCIAL

## 2023-12-22 ENCOUNTER — OFFICE VISIT (OUTPATIENT)
Dept: FAMILY MEDICINE CLINIC | Facility: CLINIC | Age: 53
End: 2023-12-22
Payer: COMMERCIAL

## 2023-12-22 VITALS
BODY MASS INDEX: 27.63 KG/M2 | WEIGHT: 193 LBS | DIASTOLIC BLOOD PRESSURE: 74 MMHG | OXYGEN SATURATION: 99 % | HEART RATE: 81 BPM | SYSTOLIC BLOOD PRESSURE: 123 MMHG | HEIGHT: 70 IN

## 2023-12-22 DIAGNOSIS — Z11.59 ENCOUNTER FOR HEPATITIS C SCREENING TEST FOR LOW RISK PATIENT: ICD-10-CM

## 2023-12-22 DIAGNOSIS — R53.82 CHRONIC FATIGUE: ICD-10-CM

## 2023-12-22 DIAGNOSIS — E66.3 OVERWEIGHT (BMI 25.0-29.9): ICD-10-CM

## 2023-12-22 DIAGNOSIS — M48.02 CERVICAL SPINAL STENOSIS: ICD-10-CM

## 2023-12-22 DIAGNOSIS — Z12.11 COLON CANCER SCREENING: ICD-10-CM

## 2023-12-22 DIAGNOSIS — L72.9 SKIN CYST: ICD-10-CM

## 2023-12-22 DIAGNOSIS — Z76.89 ENCOUNTER TO ESTABLISH CARE WITH NEW DOCTOR: Primary | ICD-10-CM

## 2023-12-22 PROBLEM — F17.200 CURRENT SMOKER: Status: ACTIVE | Noted: 2023-12-22

## 2023-12-22 NOTE — PROGRESS NOTES
Subjective:       Chago Riddle is a 53 y.o. male with a concurrent medical history of cervical spinal stenosis and current smoking who presents to establish care.    Mr. Riddle has history of cervical spinal stenosis.  He is currently followed by neurosurgery and they are in discussions regarding surgery for this.  He is working to stop smoking at the moment.  He requests referral to pain management and we will place this at his request today.  There are MRI results in the chart showing his condition.    Mr. Riddle is a current every day smoker. In future visits, we will continue to discuss cessation as we discussed today.  As he is over 54, he may need a lung cancer screening CT and we will discuss this in the future.    Mr. Riddle has had a cyst in his face that has been bothersome to him and his daughter has tried unsuccessfully to drain.  I will refer to dermatology today at his request.    The following portions of the patient's history were reviewed and updated as appropriate: allergies, current medications, past family history, past medical history, past social history, past surgical history, and problem list.    Past Medical Hx:  Past Medical History:   Diagnosis Date    Arthritis     Headache     Kidney stone     Low back pain     Neuromuscular disorder        Past Surgical Hx:  Past Surgical History:   Procedure Laterality Date    BACK SURGERY      FRACTURE SURGERY  2010    SPINE SURGERY  2010       Current Meds:  No current outpatient medications on file.    Allergies:  No Known Allergies    Family Hx:  Family History   Problem Relation Age of Onset    Diabetes Mother     Arthritis Brother     Cancer Brother         Social History:  Social History     Socioeconomic History    Marital status:    Tobacco Use    Smoking status: Every Day     Packs/day: 0.50     Years: 15.00     Additional pack years: 0.00     Total pack years: 7.50     Types: Cigarettes    Tobacco comments:     Trying to quit  "  Substance and Sexual Activity    Alcohol use: Never    Sexual activity: Yes     Partners: Female     Birth control/protection: None       Review of Systems  Review of Systems   Constitutional:  Positive for fatigue.   Respiratory:  Negative for shortness of breath and wheezing.    Musculoskeletal:  Positive for back pain and neck pain.       Objective:     /74   Pulse 81   Ht 177.8 cm (70\")   Wt 87.5 kg (193 lb)   SpO2 99%   BMI 27.69 kg/m²   Physical Exam  Constitutional:       General: He is not in acute distress.     Appearance: Normal appearance. He is not ill-appearing, toxic-appearing or diaphoretic.   HENT:      Mouth/Throat:      Mouth: Mucous membranes are moist.      Pharynx: Oropharynx is clear. No oropharyngeal exudate.   Eyes:      Extraocular Movements: Extraocular movements intact.   Cardiovascular:      Rate and Rhythm: Normal rate and regular rhythm.   Pulmonary:      Effort: Pulmonary effort is normal.      Breath sounds: Normal breath sounds.   Abdominal:      General: Bowel sounds are normal. There is no distension.      Palpations: Abdomen is soft.      Tenderness: There is no abdominal tenderness. There is no guarding.   Skin:     Comments: Facial cyst, irritated due to daughter's attempted drainage   Neurological:      Mental Status: He is alert.   Psychiatric:         Mood and Affect: Mood normal.         Behavior: Behavior normal.          Assessment/Plan:     Diagnoses and all orders for this visit:    1. Encounter to establish care with new doctor (Primary)    2. Cervical spinal stenosis  Mr. Riddle has history of cervical spinal stenosis.  He is currently followed by neurosurgery and they are in discussions regarding surgery for this.  He is working to stop smoking at the moment.  He requests referral to pain management and we will place this at his request today.  There are MRI results in the chart showing his condition.      -     Ambulatory Referral to Pain " Management    3. Colon cancer screening  Agrees to colon cancer screen.     -     Cologuard - Stool, Per Rectum; Future    4. Overweight (BMI 25.0-29.9)    Obtain screening labs.     -     Comprehensive metabolic panel; Future  -     CBC No Differential; Future  -     Lipid panel; Future    5. Encounter for hepatitis C screening test for low risk patient  -     Hepatitis C antibody; Future    6. Skin cyst        Mr. Riddle has had a cyst in his face that has been bothersome to him and his daughter has tried unsuccessfully to drain.  I will refer to dermatology today at his request.    -     Ambulatory Referral to Dermatology    7. Chronic fatigue    Endorses some fatigue. Will order TSH.     -     TSH+Free T4; Future    8. Current smoker    Mr. Riddle is a current every day smoker. In future visits, we will continue to discuss cessation as we discussed today.  As he is over 54, he may need a lung cancer screening CT and we will discuss this in the future.      Rx changes: None      Follow-up:     Return in about 3 months (around 3/22/2024) for Recheck.    Preventative:  Health Maintenance   Topic Date Due    BMI FOLLOWUP  Never done    COLORECTAL CANCER SCREENING  Never done    HEPATITIS C SCREENING  Never done    ANNUAL PHYSICAL  Never done    COVID-19 Vaccine (1) 12/23/2023 (Originally 2/23/1971)    Pneumococcal Vaccine 0-64 (1 - PCV) 12/23/2023 (Originally 8/23/1975)    INFLUENZA VACCINE  12/23/2023 (Originally 8/1/2023)    TDAP/TD VACCINES (1 - Tdap) 12/23/2023 (Originally 8/23/1988)    ZOSTER VACCINE (1 of 2) 12/23/2023 (Originally 8/23/2019)           This document has been electronically signed by Samir Mejia MD on December 22, 2023 09:56 EST       Parts of this note are electronic transcriptions/translations of spoken language to printed text using the Dragon Dictation system.

## 2024-02-01 ENCOUNTER — TELEPHONE (OUTPATIENT)
Dept: FAMILY MEDICINE CLINIC | Facility: CLINIC | Age: 54
End: 2024-02-01
Payer: COMMERCIAL

## 2024-02-09 ENCOUNTER — TELEPHONE (OUTPATIENT)
Dept: FAMILY MEDICINE CLINIC | Facility: CLINIC | Age: 54
End: 2024-02-09
Payer: COMMERCIAL

## 2024-03-22 ENCOUNTER — OFFICE VISIT (OUTPATIENT)
Dept: FAMILY MEDICINE CLINIC | Facility: CLINIC | Age: 54
End: 2024-03-22
Payer: COMMERCIAL

## 2024-03-22 VITALS — HEIGHT: 70 IN | WEIGHT: 199 LBS | BODY MASS INDEX: 28.49 KG/M2

## 2024-03-22 DIAGNOSIS — R09.81 CONGESTION OF NASAL SINUS: ICD-10-CM

## 2024-03-22 DIAGNOSIS — J30.1 SEASONAL ALLERGIC RHINITIS DUE TO POLLEN: Primary | ICD-10-CM

## 2024-03-22 PROCEDURE — 99213 OFFICE O/P EST LOW 20 MIN: CPT | Performed by: STUDENT IN AN ORGANIZED HEALTH CARE EDUCATION/TRAINING PROGRAM

## 2024-03-22 RX ORDER — HYDROCODONE BITARTRATE AND ACETAMINOPHEN 5; 325 MG/1; MG/1
TABLET ORAL
COMMUNITY

## 2024-03-22 RX ORDER — FEXOFENADINE HCL 60 MG/1
60 TABLET, FILM COATED ORAL DAILY
Qty: 90 TABLET | Refills: 1 | Status: SHIPPED | OUTPATIENT
Start: 2024-03-22

## 2024-03-22 RX ORDER — PSEUDOEPHEDRINE HCL 30 MG
30 TABLET ORAL EVERY 6 HOURS PRN
Qty: 12 TABLET | Refills: 0 | Status: SHIPPED | OUTPATIENT
Start: 2024-03-22

## 2024-03-22 RX ORDER — GUAIFENESIN 600 MG/1
1200 TABLET, EXTENDED RELEASE ORAL 2 TIMES DAILY
Qty: 8 TABLET | Refills: 0 | Status: SHIPPED | OUTPATIENT
Start: 2024-03-22

## 2024-03-22 RX ORDER — FLUTICASONE PROPIONATE 50 MCG
2 SPRAY, SUSPENSION (ML) NASAL DAILY
Qty: 11.1 ML | Refills: 2 | Status: SHIPPED | OUTPATIENT
Start: 2024-03-22

## 2024-03-22 RX ORDER — GABAPENTIN 300 MG/1
CAPSULE ORAL
COMMUNITY
Start: 2024-02-06

## 2024-03-22 NOTE — PROGRESS NOTES
Subjective:       Chago Riddle is a 53 y.o. male who presents for allergic rhinitis.     Chago has a history of seasonal allergic rhinitis.  Symptoms described under review of systems.  With the change in seasons, he noticed his current symptoms beginning approximately 3 days ago triggered by being out in the yard.  He is not yet taking any medication for it.  He has taken Claritin in the past and did not notice this made much of a difference for him.  Would treat his chronic allergic rhinitis with Flonase and Allegra.  Would give him extra medicine in the short-term to help with his symptoms as needed in the form of short course of Mucinex and short course of Sudafed.  Will see him back in 6 weeks to assess response to treatment and if he is still struggling in terms of allergies, would consider adding Singulair.    The following portions of the patient's history were reviewed and updated as appropriate: allergies, current medications, past family history, past medical history, past social history, past surgical history, and problem list.    Past Medical Hx:  Past Medical History:   Diagnosis Date    Arthritis     Headache     Kidney stone     Low back pain     Neuromuscular disorder        Past Surgical Hx:  Past Surgical History:   Procedure Laterality Date    BACK SURGERY      FRACTURE SURGERY  2010    SPINE SURGERY  2010       Current Meds:    Current Outpatient Medications:     gabapentin (NEURONTIN) 300 MG capsule, Take 1 capsule 3 times a day by oral route as directed for 30 days., Disp: , Rfl:     fexofenadine (Allegra Allergy) 60 MG tablet, Take 1 tablet by mouth Daily., Disp: 90 tablet, Rfl: 1    fluticasone (FLONASE) 50 MCG/ACT nasal spray, 2 sprays into the nostril(s) as directed by provider Daily., Disp: 11.1 mL, Rfl: 2    guaiFENesin (Mucinex) 600 MG 12 hr tablet, Take 2 tablets by mouth 2 (Two) Times a Day., Disp: 8 tablet, Rfl: 0    HYDROcodone-acetaminophen (NORCO) 5-325 MG per tablet, take 1  "tablet by mouth three times a day as needed for 30 days, Disp: , Rfl:     pseudoephedrine (Sudafed) 30 MG tablet, Take 1 tablet by mouth Every 6 (Six) Hours As Needed for Congestion., Disp: 12 tablet, Rfl: 0    Allergies:  No Known Allergies    Family Hx:  Family History   Problem Relation Age of Onset    Diabetes Mother     Arthritis Brother     Cancer Brother         Social History:  Social History     Socioeconomic History    Marital status:    Tobacco Use    Smoking status: Every Day     Current packs/day: 0.50     Average packs/day: 0.5 packs/day for 15.0 years (7.5 ttl pk-yrs)     Types: Cigarettes    Tobacco comments:     Trying to quit   Substance and Sexual Activity    Alcohol use: Never    Sexual activity: Yes     Partners: Female     Birth control/protection: None       Review of Systems  Review of Systems   HENT:  Positive for congestion, rhinorrhea, sinus pressure and sneezing. Negative for ear pain, hearing loss, postnasal drip and sore throat.    Respiratory:  Negative for cough, shortness of breath and wheezing.    Allergic/Immunologic: Positive for environmental allergies.       Objective:     Ht 177.8 cm (70\")   Wt 90.3 kg (199 lb)   BMI 28.55 kg/m²   Physical Exam  Constitutional:       General: He is not in acute distress.     Appearance: Normal appearance. He is not ill-appearing, toxic-appearing or diaphoretic.   Pulmonary:      Effort: Pulmonary effort is normal. No respiratory distress.   Neurological:      Mental Status: He is alert.   Psychiatric:         Mood and Affect: Mood normal.         Behavior: Behavior normal.          Assessment/Plan:     Diagnoses and all orders for this visit:    1. Seasonal allergic rhinitis due to pollen (Primary)      Chago has a history of seasonal allergic rhinitis.  Symptoms described under review of systems.  With the change in seasons, he noticed his current symptoms beginning approximately 3 days ago triggered by being out in the yard.  He is " not yet taking any medication for it.  He has taken Claritin in the past and did not notice this made much of a difference for him.  Would treat his chronic allergic rhinitis with Flonase and Allegra.  Would give him extra medicine in the short-term to help with his symptoms as needed in the form of short course of Mucinex and short course of Sudafed.  Will see him back in 6 weeks to assess response to treatment and if he is still struggling in terms of allergies, would consider adding Singulair.    -     fluticasone (FLONASE) 50 MCG/ACT nasal spray; 2 sprays into the nostril(s) as directed by provider Daily.  Dispense: 11.1 mL; Refill: 2  -     fexofenadine (Allegra Allergy) 60 MG tablet; Take 1 tablet by mouth Daily.  Dispense: 90 tablet; Refill: 1    2. Congestion of nasal sinus  -     guaiFENesin (Mucinex) 600 MG 12 hr tablet; Take 2 tablets by mouth 2 (Two) Times a Day.  Dispense: 8 tablet; Refill: 0  -     pseudoephedrine (Sudafed) 30 MG tablet; Take 1 tablet by mouth Every 6 (Six) Hours As Needed for Congestion.  Dispense: 12 tablet; Refill: 0          Rx changes: See body of note    Follow-up:     Return in about 6 weeks (around 5/3/2024) for Allergic Rhinitis .    Preventative:  Health Maintenance   Topic Date Due    BMI FOLLOWUP  Never done    ZOSTER VACCINE (1 of 2) Never done    HEPATITIS C SCREENING  Never done    ANNUAL PHYSICAL  Never done    COVID-19 Vaccine (1 - 2023-24 season) 03/23/2024 (Originally 9/1/2023)    Pneumococcal Vaccine 0-64 (1 of 2 - PCV) 03/23/2024 (Originally 8/23/1975)    INFLUENZA VACCINE  03/23/2024 (Originally 8/1/2023)    TDAP/TD VACCINES (1 - Tdap) 03/23/2024 (Originally 8/23/1988)    COLORECTAL CANCER SCREENING  01/07/2027         This document has been electronically signed by Samir Mejia MD on March 22, 2024 10:34 EDT       Parts of this note are electronic transcriptions/translations of spoken language to printed text using the Dragon Dictation system.

## 2024-05-03 ENCOUNTER — OFFICE VISIT (OUTPATIENT)
Dept: FAMILY MEDICINE CLINIC | Facility: CLINIC | Age: 54
End: 2024-05-03
Payer: COMMERCIAL

## 2024-05-03 VITALS
BODY MASS INDEX: 28.18 KG/M2 | SYSTOLIC BLOOD PRESSURE: 136 MMHG | OXYGEN SATURATION: 98 % | HEART RATE: 95 BPM | DIASTOLIC BLOOD PRESSURE: 71 MMHG | WEIGHT: 196.4 LBS

## 2024-05-03 DIAGNOSIS — J30.1 SEASONAL ALLERGIC RHINITIS DUE TO POLLEN: Primary | ICD-10-CM

## 2024-05-03 DIAGNOSIS — Z87.891 PERSONAL HISTORY OF NICOTINE DEPENDENCE: ICD-10-CM

## 2024-05-03 PROCEDURE — 1159F MED LIST DOCD IN RCRD: CPT | Performed by: STUDENT IN AN ORGANIZED HEALTH CARE EDUCATION/TRAINING PROGRAM

## 2024-05-03 PROCEDURE — 1160F RVW MEDS BY RX/DR IN RCRD: CPT | Performed by: STUDENT IN AN ORGANIZED HEALTH CARE EDUCATION/TRAINING PROGRAM

## 2024-05-03 PROCEDURE — 99213 OFFICE O/P EST LOW 20 MIN: CPT | Performed by: STUDENT IN AN ORGANIZED HEALTH CARE EDUCATION/TRAINING PROGRAM

## 2024-05-03 RX ORDER — MONTELUKAST SODIUM 10 MG/1
10 TABLET ORAL NIGHTLY
Qty: 90 TABLET | Refills: 0 | Status: SHIPPED | OUTPATIENT
Start: 2024-05-03

## 2024-05-03 RX ORDER — TRIAMCINOLONE ACETONIDE 55 UG/1
2 SPRAY, METERED NASAL DAILY
Qty: 16.5 G | Refills: 11 | Status: SHIPPED | OUTPATIENT
Start: 2024-05-03

## 2024-05-03 NOTE — PROGRESS NOTES
Subjective:       Chago Riddle is a 53 y.o. male who presents for 6-week follow-up for allergic rhinitis.    At our last visit, on 3/22/2024, Chago reported a history of seasonal allergic rhinitis.  I started him on Flonase and Allegra and also some as needed medications for the short-term.  I requested for him to follow-up in 6 weeks to consider starting Singulair if symptoms are still uncontrolled. Today, Chago tells me he thinks the flonase is helping with sneezing. Eye burning is improved.     Based on this, would start Singulair.  Discussed benefits and risk, including side effect profile that includes sedation.  He denies suicidal ideation, a very rare side effect of this medication.    I do think he would also benefit from smoking cessation and we discussed this today. Has tried wellbutrin and chantix with side effects.     Would see back in six weeks.    The following portions of the patient's history were reviewed and updated as appropriate: allergies, current medications, past family history, past medical history, past social history, past surgical history, and problem list.    Past Medical Hx:  Past Medical History:   Diagnosis Date    Arthritis     Headache     Kidney stone     Low back pain     Neuromuscular disorder        Past Surgical Hx:  Past Surgical History:   Procedure Laterality Date    BACK SURGERY      FRACTURE SURGERY  2010    SPINE SURGERY  2010       Current Meds:    Current Outpatient Medications:     fexofenadine (Allegra Allergy) 60 MG tablet, Take 1 tablet by mouth Daily., Disp: 90 tablet, Rfl: 1    gabapentin (NEURONTIN) 300 MG capsule, Take 1 capsule 3 times a day by oral route as directed for 30 days., Disp: , Rfl:     HYDROcodone-acetaminophen (NORCO) 5-325 MG per tablet, take 1 tablet by mouth three times a day as needed for 30 days, Disp: , Rfl:     montelukast (Singulair) 10 MG tablet, Take 1 tablet by mouth Every Night., Disp: 90 tablet, Rfl: 0    Triamcinolone Acetonide  (NASACORT) 55 MCG/ACT nasal inhaler, 2 sprays into the nostril(s) as directed by provider Daily., Disp: 16.5 g, Rfl: 11    Allergies:  No Known Allergies    Family Hx:  Family History   Problem Relation Age of Onset    Diabetes Mother     Arthritis Brother     Cancer Brother         Social History:  Social History     Socioeconomic History    Marital status:    Tobacco Use    Smoking status: Every Day     Current packs/day: 0.50     Average packs/day: 0.5 packs/day for 15.0 years (7.5 ttl pk-yrs)     Types: Cigarettes    Tobacco comments:     Trying to quit   Substance and Sexual Activity    Alcohol use: Never    Sexual activity: Yes     Partners: Female     Birth control/protection: None       Review of Systems  Review of Systems   Respiratory:  Positive for cough and shortness of breath.    Allergic/Immunologic: Positive for environmental allergies.   Psychiatric/Behavioral:  Negative for suicidal ideas.        Objective:     /71 (BP Location: Left arm, Patient Position: Sitting, Cuff Size: Adult)   Pulse 95   Wt 89.1 kg (196 lb 6.4 oz)   SpO2 98%   BMI 28.18 kg/m²   Physical Exam  Constitutional:       General: He is not in acute distress.     Appearance: Normal appearance. He is not ill-appearing, toxic-appearing or diaphoretic.   Pulmonary:      Effort: Pulmonary effort is normal. No respiratory distress.      Breath sounds: Normal breath sounds. No stridor. No wheezing, rhonchi or rales.   Neurological:      Mental Status: He is alert.   Psychiatric:         Mood and Affect: Mood normal.         Behavior: Behavior normal.          Assessment/Plan:     Diagnoses and all orders for this visit:    1. Seasonal allergic rhinitis due to pollen (Primary)    At our last visit, on 3/22/2024, Chago reported a history of seasonal allergic rhinitis.  I started him on Flonase and Allegra and also some as needed medications for the short-term.  I requested for him to follow-up in 6 weeks to consider  starting Singulair if symptoms are still uncontrolled. Today, Chago tells me he thinks the flonase is helping with sneezing. Eye burning is improved.     Based on this, would start Singulair.  Discussed benefits and risk, including side effect profile that includes sedation.  He denies suicidal ideation, a very rare side effect of this medication.    I do think he would also benefit from smoking cessation and we discussed this today. Has tried wellbutrin and chantix with side effects.     Would see back in six weeks.      -     montelukast (Singulair) 10 MG tablet; Take 1 tablet by mouth Every Night.  Dispense: 90 tablet; Refill: 0  -     Triamcinolone Acetonide (NASACORT) 55 MCG/ACT nasal inhaler; 2 sprays into the nostril(s) as directed by provider Daily.  Dispense: 16.5 g; Refill: 11          Rx changes: starting singulair , switching flonase to nasacort       Follow-up:     Return in about 6 weeks (around 6/14/2024) for Allergic rhinitis.    Preventative:  Health Maintenance   Topic Date Due    BMI FOLLOWUP  Never done    Pneumococcal Vaccine 0-64 (1 of 2 - PCV) Never done    TDAP/TD VACCINES (1 - Tdap) Never done    ZOSTER VACCINE (1 of 2) Never done    HEPATITIS C SCREENING  Never done    ANNUAL PHYSICAL  Never done    COVID-19 Vaccine (1 - 2023-24 season) Never done    INFLUENZA VACCINE  08/01/2024    COLORECTAL CANCER SCREENING  01/07/2027         This document has been electronically signed by Samir Mejia MD on May 3, 2024 13:30 EDT       Parts of this note are electronic transcriptions/translations of spoken language to printed text using the Dragon Dictation system.

## 2024-05-16 ENCOUNTER — HOSPITAL ENCOUNTER (OUTPATIENT)
Dept: CT IMAGING | Facility: HOSPITAL | Age: 54
Discharge: HOME | End: 2024-05-16
Admitting: STUDENT IN AN ORGANIZED HEALTH CARE EDUCATION/TRAINING PROGRAM
Payer: COMMERCIAL

## 2024-05-16 DIAGNOSIS — Z87.891 PERSONAL HISTORY OF NICOTINE DEPENDENCE: ICD-10-CM

## 2024-05-16 PROCEDURE — 71271 CT THORAX LUNG CANCER SCR C-: CPT

## 2024-05-17 DIAGNOSIS — R91.8 PULMONARY NODULES: Primary | ICD-10-CM

## 2024-05-17 NOTE — PROGRESS NOTES
I have reviewed the results of Chago's CT.  Unfortunately, it does show the presence of multiple pulmonary nodules including a 6 mm nodule and an 8 mm nodule in the right lung.  Because of this, the radiologist recommends obtaining repeat CT in 6 months rather than waiting 12 months.  I have ordered this today.  Can we notify patient of this?    Thank you,    Samir Mejia    ?  This document has been electronically signed by Samir Mejia MD on May 17, 2024 15:30 EDT

## 2024-06-14 ENCOUNTER — OFFICE VISIT (OUTPATIENT)
Dept: FAMILY MEDICINE CLINIC | Facility: CLINIC | Age: 54
End: 2024-06-14
Payer: COMMERCIAL

## 2024-06-14 VITALS
WEIGHT: 196 LBS | BODY MASS INDEX: 28.06 KG/M2 | SYSTOLIC BLOOD PRESSURE: 125 MMHG | HEART RATE: 90 BPM | DIASTOLIC BLOOD PRESSURE: 80 MMHG | OXYGEN SATURATION: 96 % | HEIGHT: 70 IN

## 2024-06-14 DIAGNOSIS — J30.1 SEASONAL ALLERGIC RHINITIS DUE TO POLLEN: Primary | ICD-10-CM

## 2024-06-14 PROCEDURE — 99213 OFFICE O/P EST LOW 20 MIN: CPT | Performed by: STUDENT IN AN ORGANIZED HEALTH CARE EDUCATION/TRAINING PROGRAM

## 2024-06-14 RX ORDER — GUAIFENESIN 600 MG/1
600 TABLET, EXTENDED RELEASE ORAL 2 TIMES DAILY
Qty: 8 TABLET | Refills: 0 | Status: SHIPPED | OUTPATIENT
Start: 2024-06-14

## 2024-06-14 RX ORDER — NALOXONE HYDROCHLORIDE 4 MG/.1ML
1 SPRAY NASAL AS NEEDED
COMMUNITY

## 2024-06-14 RX ORDER — LORATADINE 10 MG/1
10 TABLET ORAL DAILY
Qty: 30 TABLET | Refills: 0 | Status: SHIPPED | OUTPATIENT
Start: 2024-06-14

## 2024-06-14 RX ORDER — AZELASTINE 1 MG/ML
2 SPRAY, METERED NASAL 2 TIMES DAILY
Qty: 30 ML | Refills: 12 | Status: SHIPPED | OUTPATIENT
Start: 2024-06-14

## 2024-06-14 RX ORDER — PSEUDOEPHEDRINE HCL 30 MG
30 TABLET ORAL EVERY 6 HOURS PRN
Qty: 12 TABLET | Refills: 0 | Status: SHIPPED | OUTPATIENT
Start: 2024-06-14

## 2024-06-14 NOTE — PROGRESS NOTES
Subjective:       Chago Riddle is a 53 y.o. male who presents for follow-up for allergic rhinitis.    Chago has been on all 3 of the basic medication classes use by family medicine physicians to treat allergic rhinitis.  He has been on antihistamine in the form of Allegra, he has been on Singulair, a leukotriene antagonist, and he has been on Nasacort and in the past Flonase, intranasal steroids.  Despite this he continues to have severe allergies.  I will provide him some short course as needed medications today to relieve his congestion including Sudafed, Mucinex, and Astelin as needed on a short-term basis.  However, because he has failed treatment with the 3 basic medication classes used by outpatient primary care provider to treat allergic rhinitis, I will also refer him to allergy today for further evaluation and management.  He expresses agreement with this plan.    The following portions of the patient's history were reviewed and updated as appropriate: allergies, current medications, past family history, past medical history, past social history, past surgical history, and problem list.    Past Medical Hx:  Past Medical History:   Diagnosis Date    Arthritis     Headache     Kidney stone     Low back pain     Neuromuscular disorder        Past Surgical Hx:  Past Surgical History:   Procedure Laterality Date    BACK SURGERY      FRACTURE SURGERY  2010    SPINE SURGERY  2010       Current Meds:    Current Outpatient Medications:     gabapentin (NEURONTIN) 300 MG capsule, Take 1 capsule 3 times a day by oral route as directed for 30 days., Disp: , Rfl:     HYDROcodone-acetaminophen (NORCO) 5-325 MG per tablet, take 1 tablet by mouth three times a day as needed for 30 days, Disp: , Rfl:     montelukast (Singulair) 10 MG tablet, Take 1 tablet by mouth Every Night., Disp: 90 tablet, Rfl: 0    naloxone (NARCAN) 4 MG/0.1ML nasal spray, 1 spray into the nostril(s) as directed by provider As Needed., Disp: , Rfl:  "    Triamcinolone Acetonide (NASACORT) 55 MCG/ACT nasal inhaler, 2 sprays into the nostril(s) as directed by provider Daily., Disp: 16.5 g, Rfl: 11    azelastine (ASTELIN) 0.1 % nasal spray, 2 sprays into the nostril(s) as directed by provider 2 (Two) Times a Day. Use in each nostril as directed, Disp: 30 mL, Rfl: 12    guaiFENesin (Mucinex) 600 MG 12 hr tablet, Take 1 tablet by mouth 2 (Two) Times a Day., Disp: 8 tablet, Rfl: 0    loratadine (Claritin) 10 MG tablet, Take 1 tablet by mouth Daily., Disp: 30 tablet, Rfl: 0    pseudoephedrine (Sudafed) 30 MG tablet, Take 1 tablet by mouth Every 6 (Six) Hours As Needed for Congestion., Disp: 12 tablet, Rfl: 0    Allergies:  No Known Allergies    Family Hx:  Family History   Problem Relation Age of Onset    Diabetes Mother     Arthritis Brother     Cancer Brother         Social History:  Social History     Socioeconomic History    Marital status:    Tobacco Use    Smoking status: Every Day     Current packs/day: 0.50     Average packs/day: 0.5 packs/day for 15.0 years (7.5 ttl pk-yrs)     Types: Cigarettes    Tobacco comments:     Trying to quit   Substance and Sexual Activity    Alcohol use: Never    Sexual activity: Yes     Partners: Female     Birth control/protection: None       Review of Systems  Review of Systems   HENT:  Positive for congestion.    Allergic/Immunologic: Positive for environmental allergies.       Objective:     /80   Pulse 90   Ht 177.8 cm (70\")   Wt 88.9 kg (196 lb)   SpO2 96%   BMI 28.12 kg/m²   Physical Exam  Constitutional:       General: He is not in acute distress.     Appearance: Normal appearance. He is not ill-appearing, toxic-appearing or diaphoretic.   Pulmonary:      Effort: Pulmonary effort is normal. No respiratory distress.   Neurological:      Mental Status: He is alert.   Psychiatric:         Mood and Affect: Mood normal.         Behavior: Behavior normal.          Assessment/Plan:     Diagnoses and all orders " for this visit:    1. Seasonal allergic rhinitis due to pollen (Primary)      Chago has been on all 3 of the basic medication classes use by family medicine physicians to treat allergic rhinitis.  He has been on antihistamine in the form of Allegra, he has been on Singulair, a leukotriene antagonist, and he has been on Nasacort and in the past Flonase, intranasal steroids.  Despite this he continues to have severe allergies.  I will provide him some short course as needed medications today to relieve his congestion including Sudafed, Mucinex, and Astelin as needed on a short-term basis.  However, because he has failed treatment with the 3 basic medication classes used by outpatient primary care provider to treat allergic rhinitis, I will also refer him to allergy today for further evaluation and management.  He expresses agreement with this plan.    -     Ambulatory Referral to Allergy  -     guaiFENesin (Mucinex) 600 MG 12 hr tablet; Take 1 tablet by mouth 2 (Two) Times a Day.  Dispense: 8 tablet; Refill: 0  -     pseudoephedrine (Sudafed) 30 MG tablet; Take 1 tablet by mouth Every 6 (Six) Hours As Needed for Congestion.  Dispense: 12 tablet; Refill: 0  -     azelastine (ASTELIN) 0.1 % nasal spray; 2 sprays into the nostril(s) as directed by provider 2 (Two) Times a Day. Use in each nostril as directed  Dispense: 30 mL; Refill: 12          Rx changes: See body of note for details    Follow-up:     Return in about 6 months (around 12/14/2024) for Annual physical.    Preventative:  Health Maintenance   Topic Date Due    BMI FOLLOWUP  Never done    HEPATITIS C SCREENING  Never done    ANNUAL PHYSICAL  Never done    COVID-19 Vaccine (1 - 2023-24 season) 06/15/2024 (Originally 9/1/2023)    Pneumococcal Vaccine 0-64 (1 of 2 - PCV) 06/15/2024 (Originally 8/23/1975)    TDAP/TD VACCINES (1 - Tdap) 06/15/2024 (Originally 8/23/1988)    ZOSTER VACCINE (1 of 2) 06/15/2024 (Originally 8/23/2019)    INFLUENZA VACCINE  08/01/2024     COLORECTAL CANCER SCREENING  01/07/2027         This document has been electronically signed by Samri Mejia MD on June 14, 2024 13:41 EDT       Parts of this note are electronic transcriptions/translations of spoken language to printed text using the Dragon Dictation system.

## 2024-07-09 DIAGNOSIS — J30.1 SEASONAL ALLERGIC RHINITIS DUE TO POLLEN: ICD-10-CM

## 2024-07-09 RX ORDER — LORATADINE 10 MG/1
10 TABLET ORAL DAILY
Qty: 30 TABLET | Refills: 1 | Status: SHIPPED | OUTPATIENT
Start: 2024-07-09

## 2024-07-25 DIAGNOSIS — J30.1 SEASONAL ALLERGIC RHINITIS DUE TO POLLEN: ICD-10-CM

## 2024-07-25 RX ORDER — MONTELUKAST SODIUM 10 MG/1
10 TABLET ORAL
Qty: 90 TABLET | Refills: 0 | Status: SHIPPED | OUTPATIENT
Start: 2024-07-25

## 2024-12-18 ENCOUNTER — HOSPITAL ENCOUNTER (EMERGENCY)
Facility: HOSPITAL | Age: 54
Discharge: HOME | End: 2024-12-18
Attending: EMERGENCY MEDICINE | Admitting: EMERGENCY MEDICINE
Payer: COMMERCIAL

## 2024-12-18 ENCOUNTER — APPOINTMENT (OUTPATIENT)
Dept: GENERAL RADIOLOGY | Facility: HOSPITAL | Age: 54
End: 2024-12-18
Payer: COMMERCIAL

## 2024-12-18 VITALS
TEMPERATURE: 98.1 F | DIASTOLIC BLOOD PRESSURE: 72 MMHG | HEIGHT: 70 IN | OXYGEN SATURATION: 91 % | RESPIRATION RATE: 18 BRPM | HEART RATE: 81 BPM | BODY MASS INDEX: 28.2 KG/M2 | SYSTOLIC BLOOD PRESSURE: 114 MMHG | WEIGHT: 197 LBS

## 2024-12-18 DIAGNOSIS — M25.531 ACUTE PAIN OF RIGHT WRIST: Primary | ICD-10-CM

## 2024-12-18 DIAGNOSIS — M77.8 TENDONITIS OF WRIST, RIGHT: ICD-10-CM

## 2024-12-18 PROCEDURE — 73110 X-RAY EXAM OF WRIST: CPT

## 2024-12-18 PROCEDURE — 99283 EMERGENCY DEPT VISIT LOW MDM: CPT

## 2024-12-18 PROCEDURE — 96372 THER/PROPH/DIAG INJ SC/IM: CPT

## 2024-12-18 PROCEDURE — 25010000002 KETOROLAC TROMETHAMINE PER 15 MG: Performed by: NURSE PRACTITIONER

## 2024-12-18 RX ORDER — KETOROLAC TROMETHAMINE 30 MG/ML
60 INJECTION, SOLUTION INTRAMUSCULAR; INTRAVENOUS ONCE
Status: COMPLETED | OUTPATIENT
Start: 2024-12-18 | End: 2024-12-18

## 2024-12-18 RX ORDER — METHYLPREDNISOLONE 4 MG/1
TABLET ORAL
Qty: 21 TABLET | Refills: 0 | Status: SHIPPED | OUTPATIENT
Start: 2024-12-18 | End: 2024-12-23

## 2024-12-18 RX ORDER — OXYCODONE AND ACETAMINOPHEN 7.5; 325 MG/1; MG/1
1 TABLET ORAL EVERY 6 HOURS PRN
Qty: 12 TABLET | Refills: 0 | Status: SHIPPED | OUTPATIENT
Start: 2024-12-18 | End: 2024-12-23

## 2024-12-18 RX ADMIN — KETOROLAC TROMETHAMINE 60 MG: 30 INJECTION, SOLUTION INTRAMUSCULAR at 04:51

## 2024-12-18 NOTE — ED PROVIDER NOTES
Time: 7:21 AM EST  Date of encounter:  12/18/2024  Independent Historian/Clinical History and Information was obtained by:   Patient    History is limited by: N/A    Chief Complaint: Right wrist pain and injury        History of Present Illness:  Patient is a 54 y.o. year old male with history of remote right wrist injury and orthopedic surgery of the wrist years ago, who presents to the emergency department for evaluation of acute severe right wrist pain over the radial aspect of the right wrist which occurred yesterday when he was using a hammer to hammer out a pin to get a trailer loose from his vehicle.    He denies any acute injury at that time but ever since has been having pain and swelling not responding to over-the-counter meds.          Patient Care Team  Primary Care Provider: Samir Mejia MD    Past Medical History:     No Known Allergies  Past Medical History:   Diagnosis Date    Arthritis     Headache     Kidney stone     Low back pain     Neuromuscular disorder      Past Surgical History:   Procedure Laterality Date    BACK SURGERY      FRACTURE SURGERY  2010    SPINE SURGERY  2010     Family History   Problem Relation Age of Onset    Diabetes Mother     Arthritis Brother     Cancer Brother        Home Medications:  Prior to Admission medications    Medication Sig Start Date End Date Taking? Authorizing Provider   azelastine (ASTELIN) 0.1 % nasal spray 2 sprays into the nostril(s) as directed by provider 2 (Two) Times a Day. Use in each nostril as directed 6/14/24   Samir Mejia MD   gabapentin (NEURONTIN) 300 MG capsule Take 1 capsule 3 times a day by oral route as directed for 30 days. 2/6/24   Chava Bergeron MD   guaiFENesin (Mucinex) 600 MG 12 hr tablet Take 1 tablet by mouth 2 (Two) Times a Day. 6/14/24   Samir Mejia MD   HYDROcodone-acetaminophen (NORCO) 5-325 MG per tablet take 1 tablet by mouth three times a day as needed for 30 days    Chava Bergeron MD   loratadine  "(Claritin) 10 MG tablet Take 1 tablet by mouth Daily. 7/9/24   Samir Mejia MD   methylPREDNISolone (MEDROL) 4 MG dose pack Take as directed on package instructions. 12/18/24   Eriberto Paredes MD   montelukast (SINGULAIR) 10 MG tablet TAKE 1 TABLET BY MOUTH EVERY DAY AT NIGHT 7/25/24   Samir Mejia MD   naloxone (NARCAN) 4 MG/0.1ML nasal spray 1 spray into the nostril(s) as directed by provider As Needed.    Provider, MD Chava   oxyCODONE-acetaminophen (PERCOCET) 7.5-325 MG per tablet Take 1 tablet by mouth Every 6 (Six) Hours As Needed for Severe Pain. 12/18/24   Eirberto Paredes MD   pseudoephedrine (Sudafed) 30 MG tablet Take 1 tablet by mouth Every 6 (Six) Hours As Needed for Congestion. 6/14/24   Samir Mejia MD   Triamcinolone Acetonide (NASACORT) 55 MCG/ACT nasal inhaler 2 sprays into the nostril(s) as directed by provider Daily. 5/3/24   Samir Mejia MD        Social History:   Social History     Tobacco Use    Smoking status: Every Day     Current packs/day: 0.50     Average packs/day: 0.5 packs/day for 15.0 years (7.5 ttl pk-yrs)     Types: Cigarettes    Tobacco comments:     Trying to quit   Substance Use Topics    Alcohol use: Never         Review of Systems:  Review of Systems   I performed a 10 point review of systems which was all negative, except for the positives found in the HPI above.    Physical Exam:  /72   Pulse 81   Temp 98.1 °F (36.7 °C) (Oral)   Resp 18   Ht 177.8 cm (70\")   Wt 89.4 kg (197 lb)   SpO2 91%   BMI 28.27 kg/m²     Physical Exam   General: Awake alert and in moderate distress, holding right wrist and pain    HEENT: Head normocephalic atraumatic, eyes PERRLA EOMI, nose normal, oropharynx normal.    Neck: Supple full range of motion, no meningismus, no lymphadenopathy    Heart: Regular rate and rhythm, no murmurs or rubs, 2+ radial pulses bilaterally    Lungs: Clear to auscultation bilaterally without wheezes or crackles, no respiratory " distress    Abdomen: Soft, nontender, nondistended, no rebound or guarding    Skin: Warm, dry, no rash    Musculoskeletal: Reduced range of motion in right wrist due to pain and swelling, and he has obvious tenderness and swelling mostly over the radial aspect of the wrist but no erythema or warmth or signs of infection or new injury, and is neurovascularly intact with good radial pulse and good capillary refill and sensation in the fingers of the right hand, no lower extremity edema    Neurologic: Oriented x3, no motor deficits no sensory deficits    Psychiatric: Mood appears stable, no psychosis            Medical Decision Making:      Comorbidities that affect care:    Previous wrist injury with ORIF    External Notes reviewed:    None      The following orders were placed and all results were independently analyzed by me:  Orders Placed This Encounter   Procedures    [facility] 06.  Wrist Brace    XR Wrist 3+ View Right    Obtain & Apply The Following- Upper extremity; Wrist cock-up       Medications Given in the Emergency Department:  Medications   ketorolac (TORADOL) injection 60 mg (60 mg Intramuscular Given 12/18/24 0451)        ED Course:         Labs:    Lab Results (last 24 hours)       ** No results found for the last 24 hours. **             Imaging:    XR Wrist 3+ View Right    Result Date: 12/18/2024  XR WRIST 3+ VW RIGHT-  Date of exam: 12/18/2024, 4:53 a.m.  Indications: PAIN & SWELLING.  Comparison: None available.  FINDINGS: Three (3) views of the right wrist are provided for review. No definite acute fracture or acute malalignment is seen. Chronic traumatic and postoperative changes are present, especially involving the radial aspect of the right wrist. A single cannulated screw fixation is seen within the right scaphoid carpal bone. 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 Subchondral lucencies involve the distal right radius and may be related to  degenerative change, possibly posttraumatic. There are also subchondral lucencies involving other bones of the right hand and right wrist, such as the scaphoid carpal bone and the base of the right third (3rd) metacarpal bone. Heterotopic bone formation is suggested. Chondrocalcinosis is possible but thought to be less likely. There may be some degree of age-indeterminate soft tissue swelling centered about the right wrist. Correlation with any relevant outside imaging studies may be helpful if available. No definite subcutaneous emphysema is appreciated. If symptoms or clinical concerns persist, consider imaging follow-up.       No acute fracture or acute malalignment is identified. Please see above comments for further detail.    Portions of this note were completed with a voice recognition program.  Electronically Signed By-Eleno Enrique MD On:12/18/2024 5:04 AM         Differential Diagnosis and Discussion:    Orthopedic Injuries: Differential diagnosis includes but is not limited to fractures, soft tissue injuries, dislocations, contusions, ligamentous injuries, tendon injuries, nerve injuries, compartment syndrome, bursitis, and vascular injuries.    PROCEDURES:    X-ray were performed in the emergency department and all X-ray impressions were independently interpreted by me.    No orders to display       Procedures    MDM         This patient is a 54-year-old right-hand-dominant male with previous history of wrist surgery and hardware placement years ago now has a new injury to the right wrist after hammering significantly yesterday trying to get a pin loose from trailer hitch.    He now has pain and swelling over the radial aspect of the right wrist some pain with range of motion.  No erythema or warmth or signs of infection are noted on exam.    I will place him in a splint for immobilization and give him a Toradol shot for pain and call in some anti-inflammatories and pain meds for a few days in addition  to supportive care instructions.    I reviewed the images of his right wrist x-rays and no acute fractures are seen but there is some soft tissue swelling and hardware present and also possible chronically scaphoid fracture with nonunion for which I will have him follow-up urgently with orthopedic surgery this week.                      Patient Care Considerations:          Consultants/Shared Management Plan:        Social Determinants of Health:    Patient is independent, reliable, and has access to care.       Disposition and Care Coordination:    Discharged: The patient is suitable and stable for discharge with no need for consideration of admission.    I have explained the patient´s condition, diagnoses and treatment plan based on the information available to me at this time. I have answered questions and addressed any concerns. The patient has a good  understanding of the patient´s diagnosis, condition, and treatment plan as can be expected at this point. The vital signs have been stable. The patient´s condition is stable and appropriate for discharge from the emergency department.      The patient will pursue further outpatient evaluation with the primary care physician or other designated or consulting physician as outlined in the discharge instructions. They are agreeable to this plan of care and follow-up instructions have been explained in detail. The patient has received these instructions in written format and has expressed an understanding of the discharge instructions. The patient is aware that any significant change in condition or worsening of symptoms should prompt an immediate return to this or the closest emergency department or call to 911.  I have explained discharge medications and the need for follow up with the patient/caretakers. This was also printed in the discharge instructions. Patient was discharged with the following medications and follow up:      Medication List        New  Prescriptions      methylPREDNISolone 4 MG dose pack  Commonly known as: MEDROL  Take as directed on package instructions.     oxyCODONE-acetaminophen 7.5-325 MG per tablet  Commonly known as: PERCOCET  Take 1 tablet by mouth Every 6 (Six) Hours As Needed for Severe Pain.               Where to Get Your Medications        These medications were sent to [facility]/pharmacy #[id number] - [place], [place] - [address] - [phone]  - [phone]   [address] [place] [address]      Hours: 24-hours Phone: [phone]   methylPREDNISolone 4 MG dose pack  oxyCODONE-acetaminophen 7.5-325 MG per tablet      Batsheva Ayon MD  [address] [facility] [address] [phone]    Call in 2 days  As needed, If symptoms worsen, for a follow-up appointment       Final diagnoses:   Acute pain of right wrist   Tendonitis of wrist, right        ED Disposition       ED Disposition   Discharge    Condition   Stable    Comment   --               This medical record created using voice recognition software.             Eriberto Paredes MD  12/18/24 [id number]

## 2024-12-18 NOTE — DISCHARGE INSTRUCTIONS
Your x-rays showed no new fractures or dislocations, but you obviously have a bunch of chronic trauma and some hardware present and they did note some swelling of the wrist.    You could have a bad sprain or even a tear of one of the tendons causing your pain and swelling.    Initial treatment is to rest, keep it elevated, ice packs if you can tolerate them to reduce pain and swelling, anti-inflammatory like ibuprofen or steroids, use the pain medicine as needed and call the orthopedic clinic for a follow-up appointment if no better in a few days.

## 2024-12-23 ENCOUNTER — OFFICE VISIT (OUTPATIENT)
Dept: ORTHOPEDIC SURGERY | Facility: CLINIC | Age: 54
End: 2024-12-23
Payer: COMMERCIAL

## 2024-12-23 ENCOUNTER — OFFICE VISIT (OUTPATIENT)
Dept: FAMILY MEDICINE CLINIC | Facility: CLINIC | Age: 54
End: 2024-12-23
Payer: COMMERCIAL

## 2024-12-23 VITALS
HEART RATE: 92 BPM | BODY MASS INDEX: 27.23 KG/M2 | SYSTOLIC BLOOD PRESSURE: 117 MMHG | OXYGEN SATURATION: 98 % | HEIGHT: 70 IN | WEIGHT: 190.2 LBS | DIASTOLIC BLOOD PRESSURE: 78 MMHG

## 2024-12-23 VITALS
OXYGEN SATURATION: 96 % | SYSTOLIC BLOOD PRESSURE: 124 MMHG | HEART RATE: 104 BPM | BODY MASS INDEX: 28.2 KG/M2 | HEIGHT: 70 IN | DIASTOLIC BLOOD PRESSURE: 81 MMHG | WEIGHT: 197 LBS

## 2024-12-23 DIAGNOSIS — M19.031 PRIMARY OSTEOARTHRITIS OF RIGHT WRIST: Primary | ICD-10-CM

## 2024-12-23 DIAGNOSIS — R41.840 CONCENTRATION DEFICIT: ICD-10-CM

## 2024-12-23 DIAGNOSIS — M79.601 RIGHT ARM PAIN: ICD-10-CM

## 2024-12-23 DIAGNOSIS — Z11.59 NEED FOR HEPATITIS C SCREENING TEST: ICD-10-CM

## 2024-12-23 DIAGNOSIS — R13.19 OTHER DYSPHAGIA: Primary | ICD-10-CM

## 2024-12-23 DIAGNOSIS — Z13.220 SCREENING CHOLESTEROL LEVEL: ICD-10-CM

## 2024-12-23 DIAGNOSIS — Z11.59 ENCOUNTER FOR HEPATITIS C SCREENING TEST FOR LOW RISK PATIENT: ICD-10-CM

## 2024-12-23 PROCEDURE — 99214 OFFICE O/P EST MOD 30 MIN: CPT | Performed by: STUDENT IN AN ORGANIZED HEALTH CARE EDUCATION/TRAINING PROGRAM

## 2024-12-23 RX ORDER — EPINEPHRINE 0.3 MG/.3ML
INJECTION SUBCUTANEOUS
COMMUNITY
Start: 2024-12-09

## 2024-12-23 RX ORDER — FEXOFENADINE HCL 60 MG/1
1 TABLET, FILM COATED ORAL DAILY
COMMUNITY
Start: 2024-12-09

## 2024-12-23 RX ORDER — FLUTICASONE PROPIONATE 50 MCG
SPRAY, SUSPENSION (ML) NASAL
COMMUNITY
Start: 2024-09-05

## 2024-12-23 NOTE — PROGRESS NOTES
Subjective:       Chago Riddle is a 54 y.o. male with a concurrent medical history of allergic rhinitis, cervical spinal stenosis, pulmonary nodule, and current everyday smoking who presents for annual physical exam.     In terms of cervical spinal stenosis, he has been followed by neurosurgery with plans for potential operative approach.  It appears they were encouraging smoking cessation prior to surgery.  I do not see that he has had recent follow-up with them. He no longer continues to see pain management. He tells me last visit was 3-4 months ago, but I am unable to see that in the chart.     In terms of allergic rhinitis, I had referred him to allergist. Patient says he say allergist gave allergy shots but he did not do well with them. That was one month ago. Current medications include flonase, singulair, claratin.     In terms of pulmonary nodule, he has upcoming CT of the lung ordered for him after CT screening for lung cancer earlier this year demonstrated a pulmonary nodule.  He tells me he has not had this yet; encouraged him to reach out to imaging center.     Patient reports decreased concentration. Patient says his daughter reports this has been a long term problem. Will start with labs to evaluate for conditions such as hypothyroidism; if negative, will refer for cognitive testing.       Patient reports feeling a throbbing vein on his right arm with cough for one month. He has been wearing a wrist splint since Friday. Denies feeling cold or clammy but does have some numbness. Does have history of cervical spinal stenosis.  Will order venous upper extremity ultrasound to rule out venous pathology.        He does report cough  CT has shown in the past emphysematous changes.  I would like to order a pulmonary function test to determine if he has COPD but he believes allergist is already done this.  I strongly requested that he have them send me those results to determine if he would benefit from an  placed on his inhaler.    He also reports intermittent dysphagia and sensation of gagging.  Risk factor includes smoking.  Will get him set up for appointment with GI for EGD.      The following portions of the patient's history were reviewed and updated as appropriate: allergies, current medications, past family history, past medical history, past social history, past surgical history, and problem list.    Past Medical Hx:  Past Medical History:   Diagnosis Date    Arthritis     Headache     Kidney stone     Low back pain     Neuromuscular disorder        Past Surgical Hx:  Past Surgical History:   Procedure Laterality Date    BACK SURGERY      FRACTURE SURGERY  2010    SPINE SURGERY  2010       Current Meds:    Current Outpatient Medications:     azelastine (ASTELIN) 0.1 % nasal spray, 2 sprays into the nostril(s) as directed by provider 2 (Two) Times a Day. Use in each nostril as directed, Disp: 30 mL, Rfl: 12    [employer] Allergy Relief 60 MG tablet, Take 1 tablet by mouth Daily., Disp: , Rfl:     EPINEPHrine (EPIPEN) 0.3 MG/0.3ML solution auto-injector injection, USE AS DIRECTED FOR ACUTE ALLERGIC REACTION, Disp: , Rfl:     fluticasone (FLONASE) 50 MCG/ACT nasal spray, SPRAY 2 SPRAYS INTO THE NOSTRILS AS DIRECTED BY PROVIDER DAILY., Disp: , Rfl:     loratadine (Claritin) 10 MG tablet, Take 1 tablet by mouth Daily., Disp: 30 tablet, Rfl: 1    montelukast (SINGULAIR) 10 MG tablet, TAKE 1 TABLET BY MOUTH EVERY DAY AT NIGHT, Disp: 90 tablet, Rfl: 0    Triamcinolone Acetonide (NASACORT) 55 MCG/ACT nasal inhaler, 2 sprays into the nostril(s) as directed by provider Daily., Disp: 16.5 g, Rfl: 11    naloxone (NARCAN) 4 MG/0.1ML nasal spray, Administer 1 spray into the nostril(s) as directed by provider As Needed. (Patient not taking: Reported on 12/23/2024), Disp: , Rfl:     Allergies:  No Known Allergies    Family Hx:  Family History   Problem Relation Age of Onset    Diabetes Mother     Arthritis Brother     Cancer Brother  "        Social History:  Social History     Socioeconomic History    Marital status:    Tobacco Use    Smoking status: Every Day     Current packs/day: 0.50     Average packs/day: 0.5 packs/day for 31.0 years (15.5 ttl pk-yrs)     Types: Cigarettes     Start date: 1994    Smokeless tobacco: Never    Tobacco comments:     Trying to quit   Vaping Use    Vaping status: Never Used   Substance and Sexual Activity    Alcohol use: Never    Drug use: Defer    Sexual activity: Yes     Partners: Female     Birth control/protection: None       Review of Systems  Review of Systems   HENT:  Positive for trouble swallowing.    Respiratory:  Positive for cough.        Objective:     /78 (BP Location: Left arm, Patient Position: Sitting, Cuff Size: Large Adult)   Pulse 92   Ht 177.8 cm (70\")   Wt 86.3 kg (190 lb 3.2 oz)   SpO2 98%   BMI 27.29 kg/m²   Physical Exam  Constitutional:       General: He is not in acute distress.     Appearance: Normal appearance. He is not ill-appearing, toxic-appearing or diaphoretic.   Cardiovascular:      Rate and Rhythm: Normal rate.   Pulmonary:      Effort: Pulmonary effort is normal. No respiratory distress.      Breath sounds: No stridor. Wheezing present. No rhonchi or rales.   Neurological:      Mental Status: He is alert.   Psychiatric:         Mood and Affect: Mood normal.         Behavior: Behavior normal.          Assessment/Plan:     Diagnoses and all orders for this visit:    1. Other dysphagia (Primary)    He also reports intermittent dysphagia and sensation of gagging.  Risk factor includes smoking.  Will get him set up for appointment with GI for EGD.    -     Ambulatory Referral to Gastroenterology    2. Concentration deficit      Patient reports decreased concentration. Patient says his daughter reports this has been a long term problem. Will start with labs to evaluate for conditions such as hypothyroidism; if negative, will refer for cognitive testing.       -   "   Comprehensive metabolic panel; Future  -     CBC No Differential; Future  -     Hemoglobin A1c; Future  -     TSH+Free T4; Future    3. Screening cholesterol level  -     Lipid panel; Future    4. Encounter for hepatitis C screening test for low risk patient  -     Hepatitis C antibody; Future    5. Need for hepatitis C screening test  -     Hepatitis C antibody; Future    6. Right arm pain      Patient reports feeling a throbbing vein on his right arm with cough for one month. He has been wearing a wrist splint since Friday. Denies feeling cold or clammy but does have some numbness. Does have history of cervical spinal stenosis.  Will order venous upper extremity ultrasound to rule out venous pathology.      -     US Venous Doppler Upper Extremity Right (duplex); Future                      Follow-up:     Return in about 1 year (around 12/23/2025) for Annual physical.    Preventative:  Health Maintenance   Topic Date Due    HEPATITIS C SCREENING  Never done    ZOSTER VACCINE (1 of 2) 12/23/2024 (Originally 8/23/2019)    COVID-19 Vaccine (1 - 2024-25 season) 03/14/2025 (Originally 9/1/2024)    INFLUENZA VACCINE  03/31/2025 (Originally 7/1/2024)    Pneumococcal Vaccine 0-64 (1 of 2 - PCV) 12/23/2025 (Originally 8/23/1975)    TDAP/TD VACCINES (1 - Tdap) 12/23/2025 (Originally 8/23/1988)    ANNUAL PHYSICAL  12/23/2025    BMI FOLLOWUP  12/23/2025    COLORECTAL CANCER SCREENING  01/07/2027           This document has been electronically signed by Samir Mejia MD on December 23, 2024 15:37 EST       Parts of this note are electronic transcriptions/translations of spoken language to printed text using the Dragon Dictation system.

## 2024-12-23 NOTE — PROGRESS NOTES
"Chief Complaint  Initial Evaluation of the Right Wrist    Subjective          Chago Riddle presents to [facility] for an evaluation  of his right wrist.     History of Present Illness    The patient presents here today for an evaluation  of his right wrist. He was using a hammer when he noticed increase in pain to the base of his thumb. He has had three prior surgeries on his hand in the past but states this was a long time ago. He has pain with range of motion. He went to the emergency room on 12/18/24 where he had x-rays and placed into a thumb spica brace. He has been taken pain medication and an oral steroid with some relief.     No Known Allergies     Social History     Socioeconomic History    Marital status:    Tobacco Use    Smoking status: Every Day     Current packs/day: 0.50     Average packs/day: 0.5 packs/day for 15.0 years (7.5 ttl pk-yrs)     Types: Cigarettes    Tobacco comments:     Trying to quit   Substance and Sexual Activity    Alcohol use: Never    Drug use: Defer    Sexual activity: Yes     Partners: Female     Birth control/protection: None        I reviewed the patient's chief complaint, history of present illness, review of systems, past medical history, surgical history, family history, social history, medications, and allergy list.     REVIEW OF SYSTEMS    Constitutional: Denies fevers, chills, weight loss  Cardiovascular: Denies chest pain, shortness of breath  Skin: Denies rashes, acute skin changes  Neurologic: Denies headache, loss of consciousness  MSK: right wrist pain       Objective   Vital Signs:   /81   Pulse 104   Ht 177.8 cm (70\")   Wt 89.4 kg (197 lb)   SpO2 96%   BMI 28.27 kg/m²     Body mass index is 28.27 kg/m².    Physical Exam    General: Alert. No acute distress.   Right upper extremity: wrist flexion to 20 degrees, 20 degrees wrist extension, multiple well healed scaphoid incisions, palpable nodule over the radial " styloid, tender to the scaphoid, tender to the radial carpal joint, stiffness with finger range of motion, arthritic deformity to the hand, sensation intact, positive  pulses,     Procedures    Imaging Results (Most Recent)       None                     Assessment and Plan        XR Wrist 3+ View Right    Result Date: 12/18/2024  Narrative: XR WRIST 3+ VW RIGHT-  Date of exam: 12/18/2024, 4:53 a.m.  Indications: PAIN & SWELLING.  Comparison: None available.  FINDINGS: Three (3) views of the right wrist are provided for review. No definite acute fracture or acute malalignment is seen. Chronic traumatic and postoperative changes are present, especially involving the radial aspect of the right wrist. A single cannulated screw fixation is seen within the right scaphoid carpal bone. 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 Subchondral lucencies involve the distal right radius and may be related to degenerative change, possibly posttraumatic. There are also subchondral lucencies involving other bones of the right hand and right wrist, such as the scaphoid carpal bone and the base of the right third (3rd) metacarpal bone. Heterotopic bone formation is suggested. Chondrocalcinosis is possible but thought to be less likely. There may be some degree of age-indeterminate soft tissue swelling centered about the right wrist. Correlation with any relevant outside imaging studies may be helpful if available. No definite subcutaneous emphysema is appreciated. If symptoms or clinical concerns persist, consider imaging follow-up.       Impression: No acute fracture or acute malalignment is identified. Please see above comments for further detail.    Portions of this note were completed with a voice recognition program.  Electronically Signed By-Eleno nErique MD On:12/18/2024 5:04 AM        Diagnoses and all orders for this visit:    1. Posttraumatic osteoarthritis of right wrist (Primary)  -      Ambulatory Referral to Hand Surgery        The patient presents here today for an evaluation  of his right wrist.     Referral placed today for Dr. Stein regarding possible operative treatment options.     He will continue the brace and current medications for pain control.     Call or return if worsening symptoms.    Scribed for Brandon Vitale MD by Leticia Duncan  12/23/2024   10:28 EST         Follow Up       PRN     Patient was given instructions and counseling regarding his condition or for health maintenance advice. Please see specific information pulled into the AVS if appropriate.     I have personally performed the services described in this document as scribed by the above individual and it is both accurate and complete. Brandon Vitale MD 12/23/24 11:52 EST

## 2024-12-27 ENCOUNTER — TRANSCRIBE ORDERS (OUTPATIENT)
Dept: FAMILY MEDICINE CLINIC | Facility: CLINIC | Age: 54
End: 2024-12-27
Payer: COMMERCIAL

## 2024-12-27 DIAGNOSIS — M79.601 RIGHT ARM PAIN: Primary | ICD-10-CM

## 2024-12-28 ENCOUNTER — APPOINTMENT (OUTPATIENT)
Dept: GENERAL RADIOLOGY | Facility: HOSPITAL | Age: 54
End: 2024-12-28
Payer: COMMERCIAL

## 2024-12-28 ENCOUNTER — APPOINTMENT (OUTPATIENT)
Dept: MRI IMAGING | Facility: HOSPITAL | Age: 54
End: 2024-12-28
Payer: COMMERCIAL

## 2024-12-28 ENCOUNTER — HOSPITAL ENCOUNTER (EMERGENCY)
Facility: HOSPITAL | Age: 54
Discharge: HOME | End: 2024-12-28
Attending: EMERGENCY MEDICINE
Payer: COMMERCIAL

## 2024-12-28 ENCOUNTER — APPOINTMENT (OUTPATIENT)
Dept: CT IMAGING | Facility: HOSPITAL | Age: 54
End: 2024-12-28
Payer: COMMERCIAL

## 2024-12-28 VITALS
RESPIRATION RATE: 17 BRPM | OXYGEN SATURATION: 98 % | WEIGHT: 191.36 LBS | SYSTOLIC BLOOD PRESSURE: 140 MMHG | BODY MASS INDEX: 27.4 KG/M2 | DIASTOLIC BLOOD PRESSURE: 92 MMHG | HEIGHT: 70 IN | TEMPERATURE: 97.4 F | HEART RATE: 76 BPM

## 2024-12-28 DIAGNOSIS — H81.10 BENIGN PAROXYSMAL POSITIONAL VERTIGO, UNSPECIFIED LATERALITY: Primary | ICD-10-CM

## 2024-12-28 LAB
ALBUMIN SERPL-MCNC: 4.2 G/DL (ref 3.5–5.2)
ALBUMIN/GLOB SERPL: 1.4 G/DL
ALP SERPL-CCNC: 114 U/L (ref 39–117)
ALT SERPL W P-5'-P-CCNC: 19 U/L (ref 1–41)
ANION GAP SERPL CALCULATED.3IONS-SCNC: 10.9 MMOL/L (ref 5–15)
AST SERPL-CCNC: 16 U/L (ref 1–40)
BASOPHILS # BLD AUTO: 0.07 10*3/MM3 (ref 0–0.2)
BASOPHILS NFR BLD AUTO: 0.8 % (ref 0–1.5)
BILIRUB SERPL-MCNC: 0.3 MG/DL (ref 0–1.2)
BILIRUB UR QL STRIP: NEGATIVE
BUN SERPL-MCNC: 9 MG/DL (ref 6–20)
BUN/CREAT SERPL: 9.8 (ref 7–25)
CALCIUM SPEC-SCNC: 9.4 MG/DL (ref 8.6–10.5)
CHLORIDE SERPL-SCNC: 104 MMOL/L (ref 98–107)
CLARITY UR: CLEAR
CO2 SERPL-SCNC: 25.1 MMOL/L (ref 22–29)
COLOR UR: YELLOW
CREAT SERPL-MCNC: 0.92 MG/DL (ref 0.76–1.27)
DEPRECATED RDW RBC AUTO: 39.3 FL (ref 37–54)
EGFRCR SERPLBLD CKD-EPI 2021: 98.2 ML/MIN/1.73
EOSINOPHIL # BLD AUTO: 0.56 10*3/MM3 (ref 0–0.4)
EOSINOPHIL NFR BLD AUTO: 6.8 % (ref 0.3–6.2)
ERYTHROCYTE [DISTWIDTH] IN BLOOD BY AUTOMATED COUNT: 12.2 % (ref 12.3–15.4)
FLUAV SUBTYP SPEC NAA+PROBE: NOT DETECTED
FLUBV RNA ISLT QL NAA+PROBE: NOT DETECTED
GEN 5 1HR TROPONIN T REFLEX: <6 NG/L
GLOBULIN UR ELPH-MCNC: 3 GM/DL
GLUCOSE BLDC GLUCOMTR-MCNC: 94 MG/DL (ref 70–99)
GLUCOSE SERPL-MCNC: 90 MG/DL (ref 65–99)
GLUCOSE UR STRIP-MCNC: NEGATIVE MG/DL
HCT VFR BLD AUTO: 42.7 % (ref 37.5–51)
HGB BLD-MCNC: 14.3 G/DL (ref 13–17.7)
HGB UR QL STRIP.AUTO: NEGATIVE
HOLD SPECIMEN: NORMAL
HOLD SPECIMEN: NORMAL
IMM GRANULOCYTES # BLD AUTO: 0.02 10*3/MM3 (ref 0–0.05)
IMM GRANULOCYTES NFR BLD AUTO: 0.2 % (ref 0–0.5)
KETONES UR QL STRIP: NEGATIVE
LEUKOCYTE ESTERASE UR QL STRIP.AUTO: NEGATIVE
LYMPHOCYTES # BLD AUTO: 2.55 10*3/MM3 (ref 0.7–3.1)
LYMPHOCYTES NFR BLD AUTO: 30.9 % (ref 19.6–45.3)
MAGNESIUM SERPL-MCNC: 2 MG/DL (ref 1.6–2.6)
MCH RBC QN AUTO: 29.6 PG (ref 26.6–33)
MCHC RBC AUTO-ENTMCNC: 33.5 G/DL (ref 31.5–35.7)
MCV RBC AUTO: 88.4 FL (ref 79–97)
MONOCYTES # BLD AUTO: 0.73 10*3/MM3 (ref 0.1–0.9)
MONOCYTES NFR BLD AUTO: 8.8 % (ref 5–12)
NEUTROPHILS NFR BLD AUTO: 4.33 10*3/MM3 (ref 1.7–7)
NEUTROPHILS NFR BLD AUTO: 52.5 % (ref 42.7–76)
NITRITE UR QL STRIP: NEGATIVE
NRBC BLD AUTO-RTO: 0 /100 WBC (ref 0–0.2)
PH UR STRIP.AUTO: >=9 [PH] (ref 5–8)
PLATELET # BLD AUTO: 332 10*3/MM3 (ref 140–450)
PMV BLD AUTO: 8.8 FL (ref 6–12)
POTASSIUM SERPL-SCNC: 4 MMOL/L (ref 3.5–5.2)
PROT SERPL-MCNC: 7.2 G/DL (ref 6–8.5)
PROT UR QL STRIP: ABNORMAL
QT INTERVAL: 386 MS
QTC INTERVAL: 433 MS
RBC # BLD AUTO: 4.83 10*6/MM3 (ref 4.14–5.8)
RSV RNA NPH QL NAA+NON-PROBE: NOT DETECTED
SARS-COV-2 RNA RESP QL NAA+PROBE: NOT DETECTED
SODIUM SERPL-SCNC: 140 MMOL/L (ref 136–145)
SP GR UR STRIP: 1.02 (ref 1–1.03)
TROPONIN T NUMERIC DELTA: NORMAL
TROPONIN T SERPL HS-MCNC: 6 NG/L
UROBILINOGEN UR QL STRIP: ABNORMAL
WBC NRBC COR # BLD AUTO: 8.26 10*3/MM3 (ref 3.4–10.8)
WHOLE BLOOD HOLD COAG: NORMAL
WHOLE BLOOD HOLD SPECIMEN: NORMAL

## 2024-12-28 PROCEDURE — 25510000002 GADOBENATE DIMEGLUMINE 529 MG/ML SOLUTION: Performed by: EMERGENCY MEDICINE

## 2024-12-28 PROCEDURE — 87637 SARSCOV2&INF A&B&RSV AMP PRB: CPT | Performed by: EMERGENCY MEDICINE

## 2024-12-28 PROCEDURE — 70553 MRI BRAIN STEM W/O & W/DYE: CPT

## 2024-12-28 PROCEDURE — 80053 COMPREHEN METABOLIC PANEL: CPT | Performed by: EMERGENCY MEDICINE

## 2024-12-28 PROCEDURE — 99285 EMERGENCY DEPT VISIT HI MDM: CPT

## 2024-12-28 PROCEDURE — 85025 COMPLETE CBC W/AUTO DIFF WBC: CPT

## 2024-12-28 PROCEDURE — 93005 ELECTROCARDIOGRAM TRACING: CPT | Performed by: EMERGENCY MEDICINE

## 2024-12-28 PROCEDURE — 36415 COLL VENOUS BLD VENIPUNCTURE: CPT

## 2024-12-28 PROCEDURE — 83735 ASSAY OF MAGNESIUM: CPT | Performed by: EMERGENCY MEDICINE

## 2024-12-28 PROCEDURE — A9577 INJ MULTIHANCE: HCPCS | Performed by: EMERGENCY MEDICINE

## 2024-12-28 PROCEDURE — 63710000001 ONDANSETRON ODT 4 MG TABLET DISPERSIBLE: Performed by: EMERGENCY MEDICINE

## 2024-12-28 PROCEDURE — 81003 URINALYSIS AUTO W/O SCOPE: CPT | Performed by: EMERGENCY MEDICINE

## 2024-12-28 PROCEDURE — 71045 X-RAY EXAM CHEST 1 VIEW: CPT

## 2024-12-28 PROCEDURE — 82948 REAGENT STRIP/BLOOD GLUCOSE: CPT

## 2024-12-28 PROCEDURE — 70450 CT HEAD/BRAIN W/O DYE: CPT

## 2024-12-28 PROCEDURE — 84484 ASSAY OF TROPONIN QUANT: CPT | Performed by: EMERGENCY MEDICINE

## 2024-12-28 PROCEDURE — 70544 MR ANGIOGRAPHY HEAD W/O DYE: CPT

## 2024-12-28 PROCEDURE — 70547 MR ANGIOGRAPHY NECK W/O DYE: CPT

## 2024-12-28 RX ORDER — MECLIZINE HYDROCHLORIDE 25 MG/1
25 TABLET ORAL EVERY 6 HOURS PRN
Qty: 12 TABLET | Refills: 0 | Status: SHIPPED | OUTPATIENT
Start: 2024-12-28

## 2024-12-28 RX ORDER — MECLIZINE HYDROCHLORIDE 25 MG/1
50 TABLET ORAL ONCE
Status: COMPLETED | OUTPATIENT
Start: 2024-12-28 | End: 2024-12-28

## 2024-12-28 RX ORDER — SODIUM CHLORIDE 0.9 % (FLUSH) 0.9 %
10 SYRINGE (ML) INJECTION AS NEEDED
Status: DISCONTINUED | OUTPATIENT
Start: 2024-12-28 | End: 2024-12-28

## 2024-12-28 RX ORDER — ONDANSETRON 4 MG/1
4 TABLET, ORALLY DISINTEGRATING ORAL ONCE
Status: COMPLETED | OUTPATIENT
Start: 2024-12-28 | End: 2024-12-28

## 2024-12-28 RX ORDER — ONDANSETRON 4 MG/1
4 TABLET, ORALLY DISINTEGRATING ORAL EVERY 6 HOURS PRN
Qty: 12 TABLET | Refills: 0 | Status: SHIPPED | OUTPATIENT
Start: 2024-12-28

## 2024-12-28 RX ADMIN — MECLIZINE HYDROCHLORIDE 50 MG: 25 TABLET ORAL at 14:35

## 2024-12-28 RX ADMIN — ONDANSETRON 4 MG: 4 TABLET, ORALLY DISINTEGRATING ORAL at 14:35

## 2024-12-28 RX ADMIN — GADOBENATE DIMEGLUMINE 18 ML: 529 INJECTION, SOLUTION INTRAVENOUS at 20:15

## 2024-12-28 NOTE — ED PROVIDER NOTES
Time: 1:50 PM EST  Date of encounter:  12/28/2024  Independent Historian/Clinical History and Information was obtained by:   Patient    History is limited by: N/A    Chief Complaint: Dizziness      History of Present Illness:  Patient is a 54 y.o. year old male who presents to the emergency department for evaluation of dizziness.  Patient reports waking up sweaty and feeling dizzy.  He reports it feels like he is spinning at times.  Patient is denying headache, nausea, vomiting or diarrhea.  At rest patient feels okay but if he turns his head or tries to sit up he has vertigo.  He denies any previous episodes of vertigo.      Patient Care Team  Primary Care Provider: Samir Mejia MD    Past Medical History:     No Known Allergies  Past Medical History:   Diagnosis Date    Arthritis     Headache     Kidney stone     Low back pain     Neuromuscular disorder      Past Surgical History:   Procedure Laterality Date    BACK SURGERY      FRACTURE SURGERY  2010    SPINE SURGERY  2010     Family History   Problem Relation Age of Onset    Diabetes Mother     Arthritis Brother     Cancer Brother        Home Medications:  Prior to Admission medications    Medication Sig Start Date End Date Taking? Authorizing Provider   azelastine (ASTELIN) 0.1 % nasal spray 2 sprays into the nostril(s) as directed by provider 2 (Two) Times a Day. Use in each nostril as directed 6/14/24   Samir Mejia MD   [facility] Allergy Relief 60 MG tablet Take 1 tablet by mouth Daily. 12/9/24   Chava Bergeron MD   EPINEPHrine (EPIPEN) 0.3 MG/0.3ML solution auto-injector injection USE AS DIRECTED FOR ACUTE ALLERGIC REACTION 12/9/24   Chava Bergeron MD   fluticasone (FLONASE) 50 MCG/ACT nasal spray SPRAY 2 SPRAYS INTO THE NOSTRILS AS DIRECTED BY PROVIDER DAILY. 9/5/24   Chava Bergeron MD   loratadine (Claritin) 10 MG tablet Take 1 tablet by mouth Daily. 7/9/24   Samir Mejia MD   montelukast (SINGULAIR) 10 MG tablet TAKE 1 TABLET  "BY MOUTH EVERY DAY AT NIGHT 7/25/24   Samir Mejia MD   naloxone (NARCAN) 4 MG/0.1ML nasal spray Administer 1 spray into the nostril(s) as directed by provider As Needed.  Patient not taking: Reported on 12/23/2024    Provider, MD Chava   Triamcinolone Acetonide (NASACORT) 55 MCG/ACT nasal inhaler 2 sprays into the nostril(s) as directed by provider Daily. 5/3/24   Samir Mejia MD        Social History:   Social History     Tobacco Use    Smoking status: Every Day     Current packs/day: 0.50     Average packs/day: 0.5 packs/day for 31.0 years (15.5 ttl pk-yrs)     Types: Cigarettes     Start date: 1994    Smokeless tobacco: Never    Tobacco comments:     Trying to quit   Vaping Use    Vaping status: Never Used   Substance Use Topics    Alcohol use: Never    Drug use: Defer         Review of Systems:  Review of Systems   Constitutional:  Positive for diaphoresis.   Musculoskeletal:  Positive for gait problem.   Neurological:  Positive for dizziness.        Physical Exam:  /88 (BP Location: Left arm, Patient Position: Lying)   Pulse 69   Temp 97.4 °F (36.3 °C) (Oral)   Resp 17   Ht 177.8 cm (70\")   Wt 86.8 kg (191 lb 5.8 oz)   SpO2 98%   BMI 27.46 kg/m²     Physical Exam  Vitals and nursing note reviewed.   Constitutional:       General: He is not in acute distress.  HENT:      Head: Normocephalic and atraumatic.   Eyes:      Extraocular Movements: Extraocular movements intact.   Cardiovascular:      Rate and Rhythm: Normal rate and regular rhythm.      Heart sounds: Normal heart sounds.   Pulmonary:      Effort: Pulmonary effort is normal. No respiratory distress.      Breath sounds: Normal breath sounds.   Abdominal:      General: Abdomen is flat.      Palpations: Abdomen is soft.      Tenderness: There is no abdominal tenderness.   Musculoskeletal:         General: Normal range of motion.      Cervical back: Normal range of motion and neck supple.   Skin:     General: Skin is warm and " dry.   Neurological:      General: No focal deficit present.      Mental Status: He is alert and oriented to person, place, and time.                    Medical Decision Making:      Comorbidities that affect care:    Neuromuscular disorder    External Notes reviewed:    Previous Clinic Note: Patient seen 12/23/2024 by Dr. 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      The following orders were placed and all results were independently analyzed by me:  Orders Placed This Encounter   Procedures    COVID-19, FLU A/B, RSV PCR 1 HR TAT - Swab, Nasopharynx    XR Chest 1 View    CT Head Without Contrast    MRI Brain With & Without Contrast    MRI Angiogram Head Without Contrast    MRI Angiogram Neck Without Contrast    [place] Draw    Comprehensive Metabolic Panel    High Sensitivity Troponin T    Magnesium    Urinalysis With Microscopic If Indicated (No Culture) - Urine, Clean Catch    CBC Auto Differential    High Sensitivity Troponin T 1Hr    NPO Diet NPO Type: Strict NPO    Undress & Gown    Continuous Pulse Oximetry    Vital Signs    Oxygen Therapy- Nasal Cannula; Titrate 1-6 LPM Per SpO2; 90 - 95%    POC Glucose Once    ECG 12 Lead ED Triage Standing Order; Weak / Dizzy / AMS    Insert Peripheral IV    Fall Precautions    CBC & Differential    Green Top (Gel)    Lavender Top    Gold Top - SST    Light Blue Top       Medications Given in the Emergency Department:  Medications   meclizine (ANTIVERT) tablet 50 mg (50 mg Oral Given 12/28/24 1435)   ondansetron ODT (ZOFRAN-ODT) disintegrating tablet 4 mg (4 mg Oral Given 12/28/24 1435)   gadobenate dimeglumine (MULTIHANCE) injection 18 mL (18 mL Intravenous Given 12/28/24 2015)        ED Course:         Labs:    Lab Results (last 24 hours)       Procedure Component Value Units Date/Time    POC Glucose Once [[id number]]  (Normal) Collected: 12/28/24 1250    Specimen: Blood Updated: 12/28/24 1253     Glucose 94 mg/dL      Comment: Serial Number:  [id number]:  [id number]       CBC & Differential [[id number]]  (Abnormal) Collected: 12/28/24 1315    Specimen: Blood Updated: 12/28/24 1325    Narrative:      The following orders were created for panel order CBC & Differential.  Procedure                               Abnormality         Status                     ---------                               -----------         ------                     CBC Auto Differential[[id number]]        Abnormal            Final result                 Please view results for these tests on the individual orders.    Comprehensive Metabolic Panel [[id number]] Collected: 12/28/24 1315    Specimen: Blood Updated: 12/28/24 1350     Glucose 90 mg/dL      BUN 9 mg/dL      Creatinine 0.92 mg/dL      Sodium 140 mmol/L      Potassium 4.0 mmol/L      Chloride 104 mmol/L      CO2 25.1 mmol/L      Calcium 9.4 mg/dL      Total Protein 7.2 g/dL      Albumin 4.2 g/dL      ALT (SGPT) 19 U/L      AST (SGOT) 16 U/L      Alkaline Phosphatase 114 U/L      Total Bilirubin 0.3 mg/dL      Globulin 3.0 gm/dL      A/G Ratio 1.4 g/dL      BUN/Creatinine Ratio 9.8     Anion Gap 10.9 mmol/L      eGFR 98.2 mL/min/1.73     Narrative:      GFR Categories in Chronic Kidney Disease (CKD)      GFR Category          GFR (mL/min/1.73)    Interpretation  G1                     90 or greater         Normal or high (1)  G2                      60-89                Mild decrease (1)  G3a                   45-59                Mild to moderate decrease  G3b                   30-44                Moderate to severe decrease  G4                    15-29                Severe decrease  G5                    14 or less           Kidney failure          (1)In the absence of evidence of kidney disease, neither GFR category G1 or G2 fulfill the criteria for CKD.    eGFR calculation 2021 CKD-EPI creatinine equation, which does not include race as a factor    High Sensitivity Troponin T [[id number]]  (Normal) Collected:  12/28/24 1315    Specimen: Blood Updated: 12/28/24 1350     HS Troponin T 6 ng/L     Narrative:      High Sensitive Troponin T Reference Range:  <14.0 ng/L- Negative Female for AMI  <22.0 ng/L- Negative Male for AMI  >=14 - Abnormal Female indicating possible myocardial injury.  >=22 - Abnormal Male indicating possible myocardial injury.   Clinicians would have to utilize clinical acumen, EKG, Troponin, and serial changes to determine if it is an Acute Myocardial Infarction or myocardial injury due to an underlying chronic condition.         Magnesium [[id number]]  (Normal) Collected: 12/28/24 1315    Specimen: Blood Updated: 12/28/24 1350     Magnesium 2.0 mg/dL     CBC Auto Differential [[id number]]  (Abnormal) Collected: 12/28/24 1315    Specimen: Blood Updated: 12/28/24 1325     WBC 8.26 10*3/mm3      RBC 4.83 10*6/mm3      Hemoglobin 14.3 g/dL      Hematocrit 42.7 %      MCV 88.4 fL      MCH 29.6 pg      MCHC 33.5 g/dL      RDW 12.2 %      RDW-SD 39.3 fl      MPV 8.8 fL      Platelets 332 10*3/mm3      Neutrophil % 52.5 %      Lymphocyte % 30.9 %      Monocyte % 8.8 %      Eosinophil % 6.8 %      Basophil % 0.8 %      Immature Grans % 0.2 %      Neutrophils, Absolute 4.33 10*3/mm3      Lymphocytes, Absolute 2.55 10*3/mm3      Monocytes, Absolute 0.73 10*3/mm3      Eosinophils, Absolute 0.56 10*3/mm3      Basophils, Absolute 0.07 10*3/mm3      Immature Grans, Absolute 0.02 10*3/mm3      nRBC 0.0 /100 WBC     COVID-19, FLU A/B, RSV PCR 1 HR TAT - Swab, Nasopharynx [[id number]]  (Normal) Collected: 12/28/24 1316    Specimen: Swab from Nasopharynx Updated: 12/28/24 1417     COVID19 Not Detected     Influenza A PCR Not Detected     Influenza B PCR Not Detected     RSV, PCR Not Detected    Narrative:      Fact sheet for providers: https://www.fda.gov/media/750495/download    Fact sheet for patients: https://www.fda.gov/media/875208/download    Test performed by PCR.    Urinalysis With Microscopic If Indicated (No  Culture) - Urine, Clean Catch [[id number]]  (Abnormal) Collected: 12/28/24 1433    Specimen: Urine, Clean Catch Updated: 12/28/24 1446     Color, UA Yellow     Appearance, UA Clear     pH, UA >=9.0     Specific Gravity, UA 1.019     Glucose, UA Negative     Ketones, UA Negative     Bilirubin, UA Negative     Blood, UA Negative     Protein, UA Trace     Leuk Esterase, UA Negative     Nitrite, UA Negative     Urobilinogen, UA 1.0 E.U./dL    Narrative:      Urine microscopic not indicated.    High Sensitivity Troponin T 1Hr [[id number]] Collected: 12/28/24 1440    Specimen: Blood Updated: 12/28/24 1516     HS Troponin T <6 ng/L      Troponin T Numeric Delta --     Comment: Unable to calculate.       Narrative:      High Sensitive Troponin T Reference Range:  <14.0 ng/L- Negative Female for AMI  <22.0 ng/L- Negative Male for AMI  >=14 - Abnormal Female indicating possible myocardial injury.  >=22 - Abnormal Male indicating possible myocardial injury.   Clinicians would have to utilize clinical acumen, EKG, Troponin, and serial changes to determine if it is an Acute Myocardial Infarction or myocardial injury due to an underlying chronic condition.                  Imaging:    MRI Angiogram Head Without Contrast, MRI Angiogram Neck Without Contrast    Result Date: 12/28/2024  MRI ANGIOGRAM NECK WO CONTRAST-, MRI ANGIOGRAM HEAD WO CONTRAST-  (COMBINED REPORT)  Date of exam: 12/28/2024, 7:38 P.M.  Indication: Vertigo; abnormal head CT.  Comparison: 12/28/2024.  TECHNIQUE:  Routine 3-D time-of-flight gradient echo imaging was obtained of the head and neck without contrast administration. There is motion artifact on the studies. Please note that on a nonenhanced neck MRI exam, the field-of-view is limited.  FINDINGS:   MRA CEREBRAL (HEAD):  INTERNAL CAROTIDS: No visible stenosis or aneurysm. ANTERIOR CEREBRALS: No visible stenosis or aneurysm. MIDDLE CEREBRALS: No visible stenosis or aneurysm. POSTERIOR CEREBRALS: No visible  stenosis or aneurysm. BASILAR: No visible stenosis or aneurysm. There is thought to be focal flow artifact involving the proximal (or inferior) basilar artery, as seen on image 7 of series 102. VERTEBRALS: No visible stenosis or aneurysm. The left vertebral artery is dominant. OTHER: Negative with no evidence of a vascular malformation. 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 The posterior communicating arteries are not clearly identified. They may be obscured. Hypoplastic or absent posterior communicating arteries are possible. Phase-encoding artifact and/or motion artifact obscure(s) the location of the anterior communicating artery.  CONCLUSION:  1. No emergent large vessel occlusion.  2. No hemodynamically significant stenoses are appreciated.  3. No MRA evidence of arteritis, arterial dissection, cerebral aneurysm, vascular malformation, fibromuscular dysplasia, or other vasculopathy.  4. No acute arterial injury is seen.  5. Please see above comments for further detail.   MRA NECK:  LEFT INTERNAL CAROTID: No hemodynamically significant stenosis or dissection. EXTERNAL CAROTID: No hemodynamically significant stenosis or dissection. COMMON CAROTID: No hemodynamically significant stenosis or dissection. VERTEBRAL: No hemodynamically significant stenosis or dissection. The left vertebral artery is dominant.  RIGHT INTERNAL CAROTID: No hemodynamically significant stenosis or dissection. EXTERNAL CAROTID: No hemodynamically significant stenosis or dissection. COMMON CAROTID: No hemodynamically significant stenosis or dissection. VERTEBRAL: No hemodynamically significant stenosis or dissection. OTHER: Negative with no evidence of a vascular malformation.  CONCLUSION:  1. No emergent large vessel occlusion is appreciated.  2. No hemodynamically significant stenoses are seen.  3. No MRA evidence of arteritis, arterial dissection, aneurysm, vascular malformation, fibromuscular dysplasia, or other vasculopathy.  4.  No acute arterial injury is seen.  5. Please see above comments for further detail.   COMMENT: If symptoms or clinical concerns persist, consider head/neck CTA examination for further imaging assessment if clinically warranted and if not contraindicated.  Portions of this note were completed with a voice recognition program.  Electronically Signed By-Eleno Enrique MD On:12/28/2024 9:13 PM      MRI Brain With & Without Contrast    Result Date: 12/28/2024   MRI BRAIN W WO CONTRAST-  Date of exam: 12/28/2024 7:37 PM  Indications: Vertigo; abnormal head CT.  Comparisons: 7/28/2024; 7/18/2023.  TECHNIQUE: Routine multiplanar/multisequence images of the brain were obtained without and with gadolinium-based contrast agent (GBCA), administered intravenously. Please see the EMR (i.e., [facility]) for the documented dose of intravenous contrast agent.  FINDINGS: No reduced ADC by DWI is identified to suggest an acute infarct or other acute brain pathology. Slight motion artifact is seen on some of the sequences. 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 Minimal T2 hyperintensity is seen in the right ventral gely (or basis pontis), as on image 21 of series 6 and image 16 of series 5, without associated enhancement, susceptibility, restricted diffusion, or mass effect. It is possibly related to an artifact from the vertebrobasilar dolichoectasia. A vascular space is possible. Minimal chronic small vessel disease would be in the differential diagnosis. There may be minimal chronic small vessel disease, especially in the central and periventricular cerebral white matter. There is slight motion artifact on the study. Age-indeterminate but probably chronic sinus disease is present, especially involving the left maxillary antrum with a 2.2 cm retention cyst present. Degenerative changes involve the partially imaged cervical spine with spinal canal narrowing, seen on the prior 7/18/2023 cervical spine MRI study. No significant abnormal  susceptibility is seen on the SWI sequence. No abnormal enhancement is appreciated. No acute intracranial hemorrhage. The extra-axial spaces and the ventricular system are within normal limits. No midline shift or acute intracranial mass effect is seen.        1. No acute brain abnormality is seen. No acute infarct. No acute intracranial hemorrhage.  2. There may be mild chronic small vessel ischemic disease.  3. Slight motion artifact obscures detail on some of the sequences.  4. The other findings are as detailed above.    Portions of this note were completed with a voice recognition program.  Electronically Signed By-Eleno Enrique MD On:12/28/2024 9:03 PM      CT Head Without Contrast    Result Date: 12/28/2024  CT HEAD WO CONTRAST Date of Exam: 12/28/2024 4:47 PM EST Indication: Dizziness headache. Comparison: None available. Technique: Axial CT images were obtained of the head without contrast administration.  Reconstructed coronal and sagittal images were also obtained. Automated exposure control and iterative construction methods were used. Findings: Gray-white matter differentiation is maintained without evidence of an acute infarction. No intracranial mass or mass effect. No extra-axial mass or collection. The ventricles and sulci are normal in size and configuration. Vague hypodensity of  the gely without additional white matter changes to suggest chronic small vessel/microangiopathic ischemic changes. Sellar and suprasellar structures are normal. Orbital and paranasal soft tissues are normal. Mucosal thickening of the maxillary sinus. Mucoid retention cyst or polyp within the left maxillary sinus. The bony calvarium appears intact. No acute fractures. No lytic or blastic bony diseases.     Impression: Vague nonspecific hypodensity of the gely with no additional findings to indicate chronic small vessel/microangiopathic ischemic changes. MRI of the brain recommended. Electronically Signed: Eleno Templeton MD   12/28/2024 5:01 PM EST  Workstation ID: [id number]    XR Chest 1 View    Result Date: 12/28/2024  XR CHEST 1 VW Date of Exam: 12/28/2024 12:55 PM EST Indication: Weak/Dizzy/AMS triage protocol Comparison: LDCT chest 5/16/2024, CXR 9/3/2012 Findings: The heart is normal in size. The lungs are well-expanded and free of infiltrates. Nodules seen on LDCT chest 5/16/2024 are not apparent on chest radiograph. Bony structures appear intact.     Impression: No active disease is seen. Electronically Signed: Hunter Kwok MD  12/28/2024 1:08 PM EST  Workstation ID: [id number]       Differential Diagnosis and Discussion:    Dizziness: Based on the patient's history, signs, and symptoms, the diffential diagnosis includes but is not limited to meningitis, stroke, sepsis, subarachnoid hemorrhage, intracranial bleeding, encephalitis, vertigo, electrolyte imbalance, and metabolic disorders.    PROCEDURES:    Labs were collected in the emergency department and all labs were reviewed and interpreted by me.  X-ray were performed in the emergency department and all X-ray impressions were independently interpreted by me.  An EKG was performed and the EKG was interpreted by me.  CT scan was performed in the emergency department and the CT scan radiology impression was interpreted by me.    ECG 12 Lead ED Triage Standing Order; Weak / Dizzy / AMS   Preliminary Result   HEART RATE=75  bpm   RR Vohbmfam=309  ms   MO Vzlgcxlp=926  ms   P Horizontal Axis=18  deg   P Front Axis=-13  deg   QRSD Interval=89  ms   QT Ytpxcmho=899  ms   WSiA=173  ms   QRS Axis=48  deg   T Wave Axis=37  deg   - OTHERWISE NORMAL ECG -   Sinus rhythm   Low voltage, precordial leads   Date and Time of Study:2024-12-28 13:55:50          Procedures    MDM  Patient's initial head CT was concerning for possible evidence of stroke in the gely.  Additional MRI imaging was obtained which was negative for acute stroke.  The patient is resting comfortably and feels better, is  alert, talkative and in no distress. The repeat examination is unremarkable and benign. The patient's symptoms are consistent with vertigo. The patient is neurologically intact, has a normal mental status. The history, exam, diagnostic testing in the patient's current condition do not suggest meningitis, stroke, sepsis, subarachnoid hemorrhage, intracranial bleeding, encephalitis or other significant pathology that would warrant further testing, continued ED treatment, admission, neurological consultation, or other specialist evaluation at this point. The vital signs have been stable. The patient's condition is stable and appropriate for discharge. The patient will pursue further outpatient evaluation with the primary care physician or other designated or consulting position as indicated in the discharge instructions.          Patient Care Considerations:          Consultants/Shared Management Plan:    None    Social Determinants of Health:    Patient is independent, reliable, and has access to care.       Disposition and Care Coordination:    Discharged: The patient is suitable and stable for discharge with no need for consideration of admission.    I have explained the patient´s condition, diagnoses and treatment plan based on the information available to me at this time. I have answered questions and addressed any concerns. The patient has a good  understanding of the patient´s diagnosis, condition, and treatment plan as can be expected at this point. The vital signs have been stable. The patient´s condition is stable and appropriate for discharge from the emergency department.      The patient will pursue further outpatient evaluation with the primary care physician or other designated or consulting physician as outlined in the discharge instructions. They are agreeable to this plan of care and follow-up instructions have been explained in detail. The patient has received these instructions in written format and  has expressed an understanding of the discharge instructions. The patient is aware that any significant change in condition or worsening of symptoms should prompt an immediate return to this or the closest emergency department or call to 911.  I have explained discharge medications and the need for follow up with the patient/caretakers. This was also printed in the discharge instructions. Patient was discharged with the following medications and follow up:      Medication List        New Prescriptions      meclizine 25 MG tablet  Commonly known as: ANTIVERT  Take 1 tablet by mouth Every 6 (Six) Hours As Needed for Dizziness.     ondansetron ODT 4 MG disintegrating tablet  Commonly known as: ZOFRAN-ODT  Place 1 tablet on the tongue Every 6 (Six) Hours As Needed for Nausea or Vomiting.               Where to Get Your Medications        These medications were sent to [facility]/pharmacy #[id number] - [place], [place] - [address] - [phone]  - [phone] FX  [address] [place] [address]      Hours: 24-hours Phone: [phone]   meclizine 25 MG tablet  ondansetron ODT 4 MG disintegrating tablet      Binh Be MD  [address]  [address]  [place] [id number] [phone]      As needed       Final diagnoses:   Benign paroxysmal positional vertigo, unspecified laterality        ED Disposition       ED Disposition   Discharge    Condition   Stable    Comment   --               This medical record created using voice recognition software.             James Stanford DO  12/28/24 5028

## 2025-01-08 ENCOUNTER — TELEPHONE (OUTPATIENT)
Dept: FAMILY MEDICINE CLINIC | Facility: CLINIC | Age: 55
End: 2025-01-08
Payer: COMMERCIAL

## 2025-01-08 DIAGNOSIS — M48.061 SPINAL STENOSIS OF LUMBAR REGION, UNSPECIFIED WHETHER NEUROGENIC CLAUDICATION PRESENT: ICD-10-CM

## 2025-01-08 DIAGNOSIS — M48.02 CERVICAL SPINAL STENOSIS: Primary | ICD-10-CM

## 2025-01-08 NOTE — TELEPHONE ENCOUNTER
Caller: Chago Riddle    Relationship: Self    Best call back number:[phone]     What is the medical concern/diagnosis: BACK PAIN    What specialty or service is being requested: ORTHOPEDICS    What is the provider, practice or medical service name: [facility]

## 2025-01-09 NOTE — TELEPHONE ENCOUNTER
Referral has been placed, with the following caveat - Dr. Rod is a Neurosurgeon, not an Orthopedist. Referral to Neurosurgery (Dr. Rod) has been placed at patient request.       This document has been electronically signed by Samir Mejia MD on January 8, 2025 20:55 EST

## 2025-01-20 LAB
QT INTERVAL: 386 MS
QTC INTERVAL: 433 MS

## 2025-01-23 ENCOUNTER — HOSPITAL ENCOUNTER (OUTPATIENT)
Dept: CARDIOLOGY | Facility: HOSPITAL | Age: 55
Discharge: HOME | End: 2025-01-23
Admitting: STUDENT IN AN ORGANIZED HEALTH CARE EDUCATION/TRAINING PROGRAM
Payer: COMMERCIAL

## 2025-01-23 DIAGNOSIS — M79.601 RIGHT ARM PAIN: ICD-10-CM

## 2025-01-23 LAB
BH CV UPPER VENOUS LEFT INTERNAL JUGULAR AUGMENT: NORMAL
BH CV UPPER VENOUS LEFT INTERNAL JUGULAR COMPRESS: NORMAL
BH CV UPPER VENOUS LEFT INTERNAL JUGULAR PHASIC: NORMAL
BH CV UPPER VENOUS LEFT INTERNAL JUGULAR SPONT: NORMAL
BH CV UPPER VENOUS LEFT SUBCLAVIAN AUGMENT: NORMAL
BH CV UPPER VENOUS LEFT SUBCLAVIAN COMPRESS: NORMAL
BH CV UPPER VENOUS LEFT SUBCLAVIAN PHASIC: NORMAL
BH CV UPPER VENOUS LEFT SUBCLAVIAN SPONT: NORMAL
BH CV UPPER VENOUS RIGHT AXILLARY AUGMENT: NORMAL
BH CV UPPER VENOUS RIGHT AXILLARY COMPRESS: NORMAL
BH CV UPPER VENOUS RIGHT AXILLARY PHASIC: NORMAL
BH CV UPPER VENOUS RIGHT AXILLARY SPONT: NORMAL
BH CV UPPER VENOUS RIGHT BASILIC FOREARM COMPRESS: NORMAL
BH CV UPPER VENOUS RIGHT BASILIC UPPER COMPRESS: NORMAL
BH CV UPPER VENOUS RIGHT BRACHIAL COMPRESS: NORMAL
BH CV UPPER VENOUS RIGHT CEPHALIC FOREARM COMPRESS: NORMAL
BH CV UPPER VENOUS RIGHT CEPHALIC UPPER COMPRESS: NORMAL
BH CV UPPER VENOUS RIGHT INTERNAL JUGULAR AUGMENT: NORMAL
BH CV UPPER VENOUS RIGHT INTERNAL JUGULAR COMPRESS: NORMAL
BH CV UPPER VENOUS RIGHT INTERNAL JUGULAR PHASIC: NORMAL
BH CV UPPER VENOUS RIGHT INTERNAL JUGULAR SPONT: NORMAL
BH CV UPPER VENOUS RIGHT RADIAL COMPRESS: NORMAL
BH CV UPPER VENOUS RIGHT SUBCLAVIAN AUGMENT: NORMAL
BH CV UPPER VENOUS RIGHT SUBCLAVIAN COMPRESS: NORMAL
BH CV UPPER VENOUS RIGHT SUBCLAVIAN PHASIC: NORMAL
BH CV UPPER VENOUS RIGHT SUBCLAVIAN SPONT: NORMAL
BH CV UPPER VENOUS RIGHT ULNAR COMPRESS: NORMAL

## 2025-01-23 PROCEDURE — 93971 EXTREMITY STUDY: CPT

## 2025-04-07 ENCOUNTER — OFFICE VISIT (OUTPATIENT)
Dept: GASTROENTEROLOGY | Facility: CLINIC | Age: 55
End: 2025-04-07
Payer: COMMERCIAL

## 2025-04-07 VITALS
SYSTOLIC BLOOD PRESSURE: 131 MMHG | HEART RATE: 91 BPM | WEIGHT: 195.4 LBS | BODY MASS INDEX: 27.97 KG/M2 | DIASTOLIC BLOOD PRESSURE: 90 MMHG | HEIGHT: 70 IN

## 2025-04-07 DIAGNOSIS — R13.14 PHARYNGOESOPHAGEAL DYSPHAGIA: Primary | ICD-10-CM

## 2025-04-07 DIAGNOSIS — R12 HEARTBURN: ICD-10-CM

## 2025-04-07 PROBLEM — M54.50 LOW BACK PAIN: Status: ACTIVE | Noted: 2025-04-07

## 2025-04-07 PROBLEM — M48.02 CERVICAL STENOSIS OF SPINAL CANAL: Status: ACTIVE | Noted: 2024-02-06

## 2025-04-07 PROCEDURE — 99214 OFFICE O/P EST MOD 30 MIN: CPT | Performed by: NURSE PRACTITIONER

## 2025-04-07 PROCEDURE — 1160F RVW MEDS BY RX/DR IN RCRD: CPT | Performed by: NURSE PRACTITIONER

## 2025-04-07 PROCEDURE — 1159F MED LIST DOCD IN RCRD: CPT | Performed by: NURSE PRACTITIONER

## 2025-04-07 RX ORDER — FAMOTIDINE 40 MG/1
40 TABLET, FILM COATED ORAL
Qty: 90 TABLET | Refills: 1 | Status: SHIPPED | OUTPATIENT
Start: 2025-04-07

## 2025-04-07 NOTE — PROGRESS NOTES
Chief Complaint     Difficulty Swallowing    Patient or patient representative verbalized consent for the use of Ambient Listening during the visit with  GWYN Hugo for chart documentation. 4/7/2025  14:05 EDT    History of Present Illness     History of Present Illness  The patient presents for evaluation of choking episodes.    He was referred by his primary care physician due to recurrent choking episodes. He reports a history of a broken neck, which he describes as resembling a bird's beak and oriented backwards. This condition has led to difficulty in swallowing certain foods such as rice, which tend to lodge in his throat. He experiences episodes of choking, during which his throat appears to close off, occurring approximately twice in the past month. These episodes are not limited to food intake but can also be triggered by severe coughing. He does not experience any spasms but describes a sensation of collapse. Initially, these episodes induced panic, with attempts to breathe exacerbating the sensation of suffocation. He localizes the sensation to his neck and reports an inability to breathe through his nose or mouth during these episodes. Occasionally, food becomes lodged, necessitating regurgitation. He has a scheduled surgery for his neck but expresses fear about the procedure.    He also reports intermittent symptoms of heartburn and reflux, which can be absent for up to 6 months, during which he can consume milk without issue. However, he then experiences periods of 1 to 2 months where he is unable to lie down at night due to these symptoms. He has identified certain foods that exacerbate his symptoms, including Coke and Sprite, which have always caused a burning sensation. He has discontinued the consumption of ice cream at night due to its association with symptom onset. His diet is not optimal, with a high intake of tuna, and he has noticed that his symptoms are more pronounced at  "night, even with the consumption of tuna or casserole.    SOCIAL HISTORY  The patient admits to smoking about 6 cigarettes a day.         History      Past Medical History:   Diagnosis Date    Arthritis     Headache     Kidney stone     Low back pain     Neuromuscular disorder        Past Surgical History:   Procedure Laterality Date    BACK SURGERY      FRACTURE SURGERY  2010    SPINE SURGERY  2010       Family History   Problem Relation Age of Onset    Diabetes Mother     Arthritis Brother     Cancer Brother         Current Medications        Current Outpatient Medications:     azelastine (ASTELIN) 0.1 % nasal spray, 2 sprays into the nostril(s) as directed by provider 2 (Two) Times a Day. Use in each nostril as directed, Disp: 30 mL, Rfl: 12    [facility] Allergy Relief 60 MG tablet, Take 1 tablet by mouth Daily., Disp: , Rfl:     EPINEPHrine (EPIPEN) 0.3 MG/0.3ML solution auto-injector injection, USE AS DIRECTED FOR ACUTE ALLERGIC REACTION, Disp: , Rfl:     loratadine (Claritin) 10 MG tablet, Take 1 tablet by mouth Daily., Disp: 30 tablet, Rfl: 1    Triamcinolone Acetonide (NASACORT) 55 MCG/ACT nasal inhaler, 2 sprays into the nostril(s) as directed by provider Daily., Disp: 16.5 g, Rfl: 11    famotidine (Pepcid) 40 MG tablet, Take 1 tablet by mouth every night at bedtime., Disp: 90 tablet, Rfl: 1     Allergies     No Known Allergies    Social History       Social History     Social History Narrative    Not on file       Immunizations     Immunization:    There is no immunization history on file for this patient.       Objective     Objective     Vital Signs:   /90 (BP Location: Left arm, Patient Position: Sitting, Cuff Size: Adult)   Pulse 91   Ht 177.8 cm (70\")   Wt 88.6 kg (195 lb 6.4 oz)   BMI 28.04 kg/m²       Physical Exam    Results      Result Review :   The following data was reviewed by: GWYN Hugo on 04/07/2025:    CBC w/diff          12/28/2024    13:15   CBC w/Diff   WBC 8.26  "   RBC 4.83    Hemoglobin 14.3    Hematocrit 42.7    MCV 88.4    MCH 29.6    MCHC 33.5    RDW 12.2    Platelets 332    Neutrophil Rel % 52.5    Immature Granulocyte Rel % 0.2    Lymphocyte Rel % 30.9    Monocyte Rel % 8.8    Eosinophil Rel % 6.8    Basophil Rel % 0.8      CMP          12/28/2024    13:15   CMP   Glucose 90    BUN 9    Creatinine 0.92    EGFR 98.2    Sodium 140    Potassium 4.0    Chloride 104    Calcium 9.4    Total Protein 7.2    Albumin 4.2    Globulin 3.0    Total Bilirubin 0.3    Alkaline Phosphatase 114    AST (SGOT) 16    ALT (SGPT) 19    Albumin/Globulin Ratio 1.4    BUN/Creatinine Ratio 9.8    Anion Gap 10.9      1/7/2024 cologuard - negative.            Results             Assessment and Plan        Assessment and Plan    Diagnoses and all orders for this visit:    1. Pharyngoesophageal dysphagia (Primary)  -     FL ESOPHAGRAM DOUBLE CONTRAST; Future    2. Heartburn  -     famotidine (Pepcid) 40 MG tablet; Take 1 tablet by mouth every night at bedtime.  Dispense: 90 tablet; Refill: 1        Assessment & Plan  1. Cervical spinal stenosis.  The patient's cervical spinal stenosis may be exerting pressure on the posterior aspect of the esophagus, potentially leading to the observed symptoms. An esophagram will be ordered to further investigate this hypothesis. If the esophagram indicates narrowing in the esophagus, an upper endoscopy may be considered.    2. Heartburn.  He reports sporadic episodes of heartburn and reflux, sometimes severe enough to disrupt sleep. He is advised to take Pepcid in the evening to manage nighttime acid production. A prescription for Pepcid has been provided to alleviate these symptoms.            Follow Up        Follow Up   Return in about 6 months (around 10/7/2025) for Dysphagia.  Patient was given instructions and counseling regarding his condition or for health maintenance advice. Please see specific information pulled into the AVS if appropriate.

## 2025-04-17 ENCOUNTER — PATIENT ROUNDING (BHMG ONLY) (OUTPATIENT)
Dept: GASTROENTEROLOGY | Facility: CLINIC | Age: 55
End: 2025-04-17
Payer: COMMERCIAL

## 2025-04-17 NOTE — PROGRESS NOTES
4/17/2025      Hello, may I speak with Chago Riddle     My name is Linnea. I am calling from [facility]. I show that you had a recent visit with GWYN White.    Before we get started may I verify your date of birth? 1970    I am calling to officially welcome you to our practice and ask about your recent visit. Is this a good time to talk?  LVM ok per LILA    Tell me about your visit with us. What things went well?    We strive to ensure that we protect your safety and privacy. Is there anything we could have done to improve this during your visit?        We're always looking for ways to make our patients' experiences even better. Do you have recommendations on ways we may improve?    Overall were you satisfied with your first visit to our practice?    I appreciate you taking the time to speak with me today. Is there anything else I can do for you?    I am glad to hear that you had a very good visit and I appreciate you taking the time to provide feedback on this call. We would greatly appreciate you filling out a survey if you receive one in the mail, email or text. This is a great opportunity to provide any additional feedback that you may think of after this call as well.       Thank you, and have a great day.

## 2025-05-15 ENCOUNTER — OFFICE VISIT (OUTPATIENT)
Dept: FAMILY MEDICINE CLINIC | Facility: CLINIC | Age: 55
End: 2025-05-15
Payer: COMMERCIAL

## 2025-05-15 VITALS
OXYGEN SATURATION: 96 % | BODY MASS INDEX: 27.49 KG/M2 | WEIGHT: 192 LBS | HEART RATE: 86 BPM | DIASTOLIC BLOOD PRESSURE: 76 MMHG | HEIGHT: 70 IN | SYSTOLIC BLOOD PRESSURE: 106 MMHG

## 2025-05-15 DIAGNOSIS — R91.1 PULMONARY NODULE: ICD-10-CM

## 2025-05-15 DIAGNOSIS — R42 VERTIGO: Primary | ICD-10-CM

## 2025-05-15 DIAGNOSIS — J30.1 SEASONAL ALLERGIC RHINITIS DUE TO POLLEN: ICD-10-CM

## 2025-05-15 PROCEDURE — 99214 OFFICE O/P EST MOD 30 MIN: CPT | Performed by: STUDENT IN AN ORGANIZED HEALTH CARE EDUCATION/TRAINING PROGRAM

## 2025-05-15 RX ORDER — LORATADINE 10 MG/1
10 TABLET ORAL DAILY
Qty: 30 TABLET | Refills: 1 | Status: SHIPPED | OUTPATIENT
Start: 2025-05-15 | End: 2025-05-15

## 2025-05-15 RX ORDER — FEXOFENADINE HCL 60 MG/1
60 TABLET, FILM COATED ORAL DAILY
Qty: 90 TABLET | Refills: 1 | Status: SHIPPED | OUTPATIENT
Start: 2025-05-15

## 2025-05-15 NOTE — PROGRESS NOTES
Subjective:       Chago Riddle is a 54 y.o. male with a concurrent medical history of allergic rhinitis, cervical spinal stenosis, pulmonary nodules, and current everyday smoking who presents to discuss MRI results obtained when he was evaluated by the emergency department.      Chago was evaluated by the emergency department in December of last year for vertigo.  He requested to go over the imaging results from that time.  He had a CT of the head performed as well as MRI of the brain and MRI angiogram of the head and neck.  Although the CT of the head initially showed a vague finding that could indicate pathology in the gely, subsequent MRI imaging did not demonstrate concerning acute findings such as stroke.  We discussed these images today.  At that time, Chago was discharged with meclizine as needed.  Chago continues to have intermittent vertigo.  Initially this was associate with position changes and normally I would recommend PT referral for vestibular exercises.  However he says that neurosurgery has requested he not do physical therapy exercising involving the neck for now.  Given the prolonged nature of his symptoms, I do think it is better today to refer him to ENT for further evaluation.        Chago is due for a another CT of the chest to screen for lung cancer in the setting of pulmonary nodules.  I did order this previously but it had not been done.  Will order another CT today.    I have previously referred Chago to GI for dysphagia.  Workup is in progress and it appears there is a pending order for esophagram.  We are grateful to GI for seeing him.        Past Medical Hx:  Past Medical History:   Diagnosis Date    Arthritis     Headache     Kidney stone     Low back pain     Neuromuscular disorder        Past Surgical Hx:  Past Surgical History:   Procedure Laterality Date    BACK SURGERY      FRACTURE SURGERY  2010    SPINE SURGERY  2010       Current Meds:    Current Outpatient Medications:      "azelastine (ASTELIN) 0.1 % nasal spray, 2 sprays into the nostril(s) as directed by provider 2 (Two) Times a Day. Use in each nostril as directed, Disp: 30 mL, Rfl: 12    [facility]S Allergy Relief 60 MG tablet, Take 1 tablet by mouth Daily., Disp: 90 tablet, Rfl: 1    EPINEPHrine (EPIPEN) 0.3 MG/0.3ML solution auto-injector injection, USE AS DIRECTED FOR ACUTE ALLERGIC REACTION, Disp: , Rfl:     famotidine (Pepcid) 40 MG tablet, Take 1 tablet by mouth every night at bedtime., Disp: 90 tablet, Rfl: 1    Triamcinolone Acetonide (NASACORT) 55 MCG/ACT nasal inhaler, 2 sprays into the nostril(s) as directed by provider Daily., Disp: 16.5 g, Rfl: 11    Allergies:  No Known Allergies    Family Hx:  Family History   Problem Relation Age of Onset    Diabetes Mother     Arthritis Brother     Cancer Brother         Social History:  Social History     Socioeconomic History    Marital status:    Tobacco Use    Smoking status: Every Day     Current packs/day: 0.50     Average packs/day: 0.5 packs/day for 31.4 years (15.7 ttl pk-yrs)     Types: Cigarettes     Start date: 1994    Smokeless tobacco: Never    Tobacco comments:     Trying to quit   Vaping Use    Vaping status: Never Used   Substance and Sexual Activity    Alcohol use: Never    Drug use: Defer    Sexual activity: Yes     Partners: Female     Birth control/protection: None       Review of Systems  Review of Systems   Neurological:  Positive for dizziness.       Objective:     /76 (BP Location: Left arm, Patient Position: Sitting, Cuff Size: Large Adult)   Pulse 86   Ht 177.8 cm (70\")   Wt 87.1 kg (192 lb)   SpO2 96%   BMI 27.55 kg/m²   Physical Exam  Constitutional:       General: He is not in acute distress.     Appearance: Normal appearance. He is not ill-appearing, toxic-appearing or diaphoretic.   Pulmonary:      Effort: Pulmonary effort is normal. No respiratory distress.   Neurological:      Mental Status: He is alert.   Psychiatric:         Mood and " Affect: Mood normal.         Behavior: Behavior normal.          Assessment/Plan:     Diagnoses and all orders for this visit:    1. Vertigo (Primary)    Chago was evaluated by the emergency department in December of last year for vertigo.  He requested to go over the imaging results from that time.  He had a CT of the head performed as well as MRI of the brain and MRI angiogram of the head and neck.  Although the CT of the head initially showed a vague finding that could indicate pathology in the gely, subsequent MRI imaging did not demonstrate concerning acute findings such as stroke.  We discussed these images today.  At that time, Chago was discharged with meclizine as needed.  Chago continues to have intermittent vertigo.  Initially this was associate with position changes and normally I would recommend PT referral for vestibular exercises.  However he says that neurosurgery has requested he not do physical therapy exercising involving the neck for now.  Given the prolonged nature of his symptoms, I do think it is better today to refer him to ENT for further evaluation.    -     Ambulatory Referral to ENT (Otolaryngology)    2. Seasonal allergic rhinitis due to pollen    Chago does request refill on daily Claritin and allergy relief.  As both medications are antihistamine, I would recommend taking only 1 of these.  Will refill the [facility] allergy relief and discontinue Claritin at this time.    -     [facility] Allergy Relief 60 MG tablet; Take 1 tablet by mouth Daily.  Dispense: 90 tablet; Refill: 1      3. Pulmonary nodule    Chago is due for a another CT of the chest to screen for lung cancer in the setting of pulmonary nodules.  I did order this previously but it had not been done.  Will order another CT today.    -     CT Chest Low Dose Follow Up Without Contrast; Future                Follow-up:     Return if symptoms worsen or fail to improve, for Next scheduled follow up.    Preventative:  Health Maintenance   Topic  Date Due    Pneumococcal Vaccine 50+ (1 of 2 - PCV) Never done    ZOSTER VACCINE (1 of 2) Never done    HEPATITIS C SCREENING  Never done    COVID-19 Vaccine (1 - 2024-25 season) 05/29/2025 (Originally 9/1/2024)    TDAP/TD VACCINES (1 - Tdap) 12/23/2025 (Originally 8/23/1988)    INFLUENZA VACCINE  07/01/2025    ANNUAL PHYSICAL  12/23/2025    COLORECTAL CANCER SCREENING  01/07/2027           This document has been electronically signed by Samir Mejia MD on May 15, 2025 13:55 EDT       Parts of this note are electronic transcriptions/translations of spoken language to printed text using the Dragon Dictation system.

## 2025-05-19 ENCOUNTER — RESULTS FOLLOW-UP (OUTPATIENT)
Dept: GASTROENTEROLOGY | Facility: CLINIC | Age: 55
End: 2025-05-19
Payer: COMMERCIAL

## 2025-05-19 ENCOUNTER — HOSPITAL ENCOUNTER (OUTPATIENT)
Dept: GENERAL RADIOLOGY | Facility: HOSPITAL | Age: 55
Discharge: HOME | End: 2025-05-19
Admitting: NURSE PRACTITIONER
Payer: COMMERCIAL

## 2025-05-19 DIAGNOSIS — R13.13 DYSPHAGIA, CRICOPHARYNGEAL: ICD-10-CM

## 2025-05-19 DIAGNOSIS — J30.1 SEASONAL ALLERGIC RHINITIS DUE TO POLLEN: ICD-10-CM

## 2025-05-19 DIAGNOSIS — R93.89 ABNORMAL FINDING ON IMAGING: Primary | ICD-10-CM

## 2025-05-19 DIAGNOSIS — R13.14 PHARYNGOESOPHAGEAL DYSPHAGIA: ICD-10-CM

## 2025-05-19 PROCEDURE — 74221 X-RAY XM ESOPHAGUS 2CNTRST: CPT

## 2025-05-19 RX ORDER — AZELASTINE 1 MG/ML
2 SPRAY, METERED NASAL 2 TIMES DAILY
Qty: 30 ML | Refills: 12 | Status: SHIPPED | OUTPATIENT
Start: 2025-05-19

## 2025-05-19 RX ORDER — TRIAMCINOLONE ACETONIDE 55 UG/1
2 SPRAY, METERED NASAL DAILY
Qty: 16.5 G | Refills: 11 | Status: SHIPPED | OUTPATIENT
Start: 2025-05-19

## 2025-05-19 RX ADMIN — BARIUM SULFATE 700 MG: 700 TABLET ORAL at 11:22

## 2025-05-19 RX ADMIN — BARIUM SULFATE 135 ML: 980 POWDER, FOR SUSPENSION ORAL at 11:23

## 2025-05-19 RX ADMIN — BARIUM SULFATE 355 ML: 0.6 SUSPENSION ORAL at 11:23

## 2025-05-19 RX ADMIN — ANTACID/ANTIFLATULENT 1 PACKET: 380; 550; 10; 10 GRANULE, EFFERVESCENT ORAL at 11:23

## 2025-05-21 NOTE — TELEPHONE ENCOUNTER
Pt is aware of results, he states he does not have an ENT but is agreeable to see one.     Referral placed please add diagnosis

## 2025-07-20 ENCOUNTER — HOSPITAL ENCOUNTER (EMERGENCY)
Facility: HOSPITAL | Age: 55
Discharge: HOME | End: 2025-07-20
Attending: EMERGENCY MEDICINE | Admitting: EMERGENCY MEDICINE
Payer: COMMERCIAL

## 2025-07-20 ENCOUNTER — APPOINTMENT (OUTPATIENT)
Dept: GENERAL RADIOLOGY | Facility: HOSPITAL | Age: 55
End: 2025-07-20
Payer: COMMERCIAL

## 2025-07-20 VITALS
BODY MASS INDEX: 26.94 KG/M2 | WEIGHT: 181.88 LBS | HEART RATE: 78 BPM | HEIGHT: 69 IN | OXYGEN SATURATION: 99 % | DIASTOLIC BLOOD PRESSURE: 90 MMHG | SYSTOLIC BLOOD PRESSURE: 156 MMHG | RESPIRATION RATE: 20 BRPM | TEMPERATURE: 98.1 F

## 2025-07-20 DIAGNOSIS — S97.82XA CRUSHING INJURY OF LEFT FOOT, INITIAL ENCOUNTER: Primary | ICD-10-CM

## 2025-07-20 PROCEDURE — 73630 X-RAY EXAM OF FOOT: CPT

## 2025-07-20 PROCEDURE — 99283 EMERGENCY DEPT VISIT LOW MDM: CPT

## 2025-07-20 RX ORDER — ORPHENADRINE CITRATE 30 MG/ML
60 INJECTION INTRAMUSCULAR; INTRAVENOUS ONCE
Status: DISCONTINUED | OUTPATIENT
Start: 2025-07-20 | End: 2025-07-20

## 2025-07-20 RX ORDER — KETOROLAC TROMETHAMINE 30 MG/ML
30 INJECTION, SOLUTION INTRAMUSCULAR; INTRAVENOUS ONCE
Status: DISCONTINUED | OUTPATIENT
Start: 2025-07-20 | End: 2025-07-20

## 2025-07-20 RX ORDER — HYDROCODONE BITARTRATE AND ACETAMINOPHEN 5; 325 MG/1; MG/1
1 TABLET ORAL ONCE
Refills: 0 | Status: COMPLETED | OUTPATIENT
Start: 2025-07-20 | End: 2025-07-20

## 2025-07-20 RX ADMIN — HYDROCODONE BITARTRATE AND ACETAMINOPHEN 1 TABLET: 5; 325 TABLET ORAL at 05:19

## 2025-07-20 NOTE — ED PROVIDER NOTES
"SHARED VISIT ATTESTATION:    This visit was performed by myself and an APC.  I personally approved the management plan/medical decision making and take responsibility for the patient management.      SHARED VISIT NOTE:    Patient is 54 y.o. year old male that presents to the ED for evaluation of foot injury.     Physical Exam    ED Course:    /83   Pulse 83   Temp 98 °F (36.7 °C) (Oral)   Resp 20   Ht 175.3 cm (69\")   Wt 82.5 kg (181 lb 14.1 oz)   SpO2 98%   BMI 26.86 kg/m²       The following orders were placed and all results were independently analyzed by me:  Orders Placed This Encounter   Procedures    XR Foot 3+ View Left    Ambulatory Referral to Podiatry       Medications Given in the Emergency Department:  Medications   orphenadrine (NORFLEX) injection 60 mg (has no administration in time range)   ketorolac (TORADOL) injection 30 mg (has no administration in time range)        ED Course:         Labs:    Lab Results (last 24 hours)       ** No results found for the last 24 hours. **             Imaging:    XR Foot 3+ View Left  Result Date: 7/20/2025  XR FOOT 3+ VW LEFT Date of Exam: 7/20/2025 4:02 AM EDT Indication: kicked lawnmower and now has swelling and pain Comparison: None available. Findings: No acute fracture or malalignment. No bone erosion or destruction. Arterial calcifications are present. There is probably an old healed fracture near the base of the fifth metatarsal. There is a tiny linear radiopaque foreign body measuring just over 3 mm in length adjacent to the base of the first distal phalanx.     1.No acute fracture or malalignment. 2.Tiny linear radiopaque foreign body adjacent to the base of the first distal phalanx. Electronically Signed: Phong Valadez MD  7/20/2025 4:27 AM EDT  Workstation ID: [id number]      MDM:    Procedures    X-ray were performed in the emergency department and all X-ray impressions were independently interpreted by me.                     Carlos HUTSON" MD Jake  04:49 EDT  07/20/25         Carlos Joseph MD  07/20/25 [id number]

## 2025-07-20 NOTE — ED PROVIDER NOTES
Time: 4:40 AM EDT  Date of encounter:  7/20/2025  Independent Historian/Clinical History and Information was obtained by:   Patient    History is limited by: N/A    Chief Complaint: Left foot pain      History of Present Illness:  Patient is a 54 y.o. year old male who presents to the emergency department for evaluation of left foot pain x 3 days.  Patient reports he kicked the side of his lawnmower a few days ago.  Reports that it has continuously gotten larger and more swollen and today was unbearable to walk on it.  Denies paresthesia.      Patient Care Team  Primary Care Provider: Samir Mejia MD    Past Medical History:     No Known Allergies  Past Medical History:   Diagnosis Date    Arthritis     Headache     Kidney stone     Low back pain     Neuromuscular disorder      Past Surgical History:   Procedure Laterality Date    BACK SURGERY      FRACTURE SURGERY  2010    SPINE SURGERY  2010     Family History   Problem Relation Age of Onset    Diabetes Mother     Arthritis Brother     Cancer Brother        Home Medications:  Prior to Admission medications    Medication Sig Start Date End Date Taking? Authorizing Provider   azelastine (ASTELIN) 0.1 % nasal spray Administer 2 sprays into the nostril(s) as directed by provider 2 (Two) Times a Day. Use in each nostril as directed 5/19/25   Samir Mejia MD   [facility] Allergy Relief 60 MG tablet Take 1 tablet by mouth Daily. 5/15/25   Samir Mejia MD   EPINEPHrine (EPIPEN) 0.3 MG/0.3ML solution auto-injector injection USE AS DIRECTED FOR ACUTE ALLERGIC REACTION 12/9/24   Provider, MD Chava   famotidine (Pepcid) 40 MG tablet Take 1 tablet by mouth every night at bedtime. 4/7/25   Diamond Green APRN   Triamcinolone Acetonide (NASACORT) 55 MCG/ACT nasal inhaler Administer 2 sprays into the nostril(s) as directed by provider Daily. 5/19/25   Samir Mejia MD        Social History:   Social History     Tobacco Use    Smoking status: Every Day      "Current packs/day: 0.50     Average packs/day: 0.5 packs/day for 31.5 years (15.8 ttl pk-yrs)     Types: Cigarettes     Start date: 1994    Smokeless tobacco: Never    Tobacco comments:     Trying to quit   Vaping Use    Vaping status: Never Used   Substance Use Topics    Alcohol use: Never     Comment: \"occ\"    Drug use: Defer         Review of Systems:  Review of Systems     Physical Exam:  /90   Pulse 78   Temp 98.1 °F (36.7 °C) (Oral)   Resp 20   Ht 175.3 cm (69\")   Wt 82.5 kg (181 lb 14.1 oz)   SpO2 99%   BMI 26.86 kg/m²     Physical Exam  Vitals and nursing note reviewed.   Constitutional:       Appearance: Normal appearance.   HENT:      Head: Normocephalic.   Eyes:      Extraocular Movements: Extraocular movements intact.      Conjunctiva/sclera: Conjunctivae normal.   Cardiovascular:      Pulses:           Dorsalis pedis pulses are 2+ on the right side and 2+ on the left side.        Posterior tibial pulses are 2+ on the right side and 2+ on the left side.   Pulmonary:      Effort: Pulmonary effort is normal.   Abdominal:      General: There is no distension.   Feet:      Left foot:      Skin integrity: Skin integrity normal.   Skin:     General: Skin is warm.      Coloration: Skin is not cyanotic.   Neurological:      Mental Status: He is alert and oriented to person, place, and time.   Psychiatric:         Attention and Perception: Attention and perception normal.         Mood and Affect: Mood normal.                    Medical Decision Making:      Comorbidities that affect care:    None    External Notes reviewed:    Previous Clinic Note: Previous clinic note on 5/19/2025 reviewed      The following orders were placed and all results were independently analyzed by me:  Orders Placed This Encounter   Procedures    XR Foot 3+ View Left    Ambulatory Referral to Podiatry       Medications Given in the Emergency Department:  Medications   HYDROcodone-acetaminophen (NORCO) 5-325 MG per tablet 1 " tablet (1 tablet Oral Given 7/20/25 0519)        ED Course:         Labs:    Lab Results (last 24 hours)       ** No results found for the last 24 hours. **             Imaging:    XR Foot 3+ View Left  Result Date: 7/20/2025  XR FOOT 3+ VW LEFT Date of Exam: 7/20/2025 4:02 AM EDT Indication: kicked lawnmower and now has swelling and pain Comparison: None available. Findings: No acute fracture or malalignment. No bone erosion or destruction. Arterial calcifications are present. There is probably an old healed fracture near the base of the fifth metatarsal. There is a tiny linear radiopaque foreign body measuring just over 3 mm in length adjacent to the base of the first distal phalanx.     1.No acute fracture or malalignment. 2.Tiny linear radiopaque foreign body adjacent to the base of the first distal phalanx. Electronically Signed: Phong Valadez MD  7/20/2025 4:27 AM EDT  Workstation ID: [id number]        Differential Diagnosis and Discussion:    Extremity Pain: Differential diagnosis includes but is not limited to soft tissue sprain, tendonitis, tendon injury, dislocation, fracture, deep vein thrombosis, arterial insufficiency, osteoarthritis, bursitis, and ligamentous damage.    PROCEDURES:    Labs were collected in the emergency department and all labs were reviewed and interpreted by me.    No orders to display       Procedures    MDM     Amount and/or Complexity of Data Reviewed  Tests in the radiology section of CPT®: reviewed    Summary, patient is a 54-year-old male presenting today secondary to an acute left foot injury.    Patient's vital signs are stable.  Patient's initial evaluation revealed evidence of appreciable pain with improvement after administration of Norflex and Toradol.  X-rays today revealed no acute bony abnormalities.  Physical exam yielded no evidence of neurovascular compromise, signs of limb ischemia, signs of DVT, or other acute concerning findings.  Discussed these findings with  the patient.  Discussed follow-up with podiatry related to this foot injury.  I discussed return precautions.  Patient voiced understanding agreement at this time                  Patient Care Considerations:    SEPSIS was considered but is NOT present in the emergency department as SIRS criteria is not present. ANTIBIOTICS: I considered prescribing antibiotics as an outpatient however no bacterial focus of infection was found.      Consultants/Shared Management Plan:    SHARED VISIT: I have discussed the case with my supervising physician, Dr. Joseph who states agrees with treatment plan. The substantive portion of the medical decision was made by the attesting physician who made or approve the management plan and will take responsibility for the patient.  Clinical findings were discussed and ultimate disposition was made in consult with supervising physician.    Social Determinants of Health:    Patient is independent, reliable, and has access to care.       Disposition and Care Coordination:    Discharged: The patient is suitable and stable for discharge with no need for consideration of admission.    I have explained the patient´s condition, diagnoses and treatment plan based on the information available to me at this time. I have answered questions and addressed any concerns. The patient has a good  understanding of the patient´s diagnosis, condition, and treatment plan as can be expected at this point. The vital signs have been stable. The patient´s condition is stable and appropriate for discharge from the emergency department.      The patient will pursue further outpatient evaluation with the primary care physician or other designated or consulting physician as outlined in the discharge instructions. They are agreeable to this plan of care and follow-up instructions have been explained in detail. The patient has received these instructions in written format and has expressed an understanding of the discharge  instructions. The patient is aware that any significant change in condition or worsening of symptoms should prompt an immediate return to this or the closest emergency department or call to 911.  I have explained discharge medications and the need for follow up with the patient/caretakers. This was also printed in the discharge instructions. Patient was discharged with the following medications and follow up:      Medication List      No changes were made to your prescriptions during this visit.      Samir Mejia MD  [address] [facility] [address]  [facility] [address]  [phone]    Schedule an appointment as soon as possible for a visit       [facility]  [address] [place] [address]  [address]  [facility] [id number] [phone]           Final diagnoses:   Crushing injury of left foot, initial encounter        ED Disposition       ED Disposition   Discharge    Condition   Stable    Comment   --               This medical record created using voice recognition software.             Primo Jarvis PA-C  07/20/25 0653

## 2025-07-20 NOTE — DISCHARGE INSTRUCTIONS
Thank you for allowing us to provide care for you today.  Your workup today revealed evidence of left foot injury without emergent bony abnormalities on x-ray or findings on your physical exam.  You are provided with pain meds during the ED course.  Please continue to take over-the-counter Tylenol and ibuprofen as needed.  These begin to rest, ice, compress, and elevate the left foot.  Thank you

## 2025-07-23 ENCOUNTER — TELEPHONE (OUTPATIENT)
Dept: FAMILY MEDICINE CLINIC | Facility: CLINIC | Age: 55
End: 2025-07-23
Payer: COMMERCIAL

## 2025-07-25 NOTE — TELEPHONE ENCOUNTER
Hub to relay & schedule:     Called patient to see if they would like to establish with another provider within the office due to Dr. Mejia leaving the office. LVM x2        
Hub to relay & schedule:     Called patient to see if they would like to establish with another provider within the office due to Dr. Mejia leaving the office. LVM x3              
Hub to relay & schedule:    Called patient to see if they would like to establish with another provider within the office due to Dr. Mejia leaving the office. LVM  
English

## 2025-08-05 ENCOUNTER — OFFICE VISIT (OUTPATIENT)
Dept: PODIATRY | Facility: CLINIC | Age: 55
End: 2025-08-05
Payer: COMMERCIAL

## 2025-08-05 VITALS
BODY MASS INDEX: 27.02 KG/M2 | OXYGEN SATURATION: 98 % | DIASTOLIC BLOOD PRESSURE: 77 MMHG | WEIGHT: 183 LBS | HEART RATE: 85 BPM | TEMPERATURE: 98 F | SYSTOLIC BLOOD PRESSURE: 120 MMHG

## 2025-08-05 DIAGNOSIS — M79.672 FOOT PAIN, LEFT: ICD-10-CM

## 2025-08-05 DIAGNOSIS — S90.32XA CONTUSION OF LEFT FOOT, INITIAL ENCOUNTER: Primary | ICD-10-CM

## 2025-08-26 RX ORDER — LORATADINE 10 MG/1
10 TABLET ORAL DAILY
OUTPATIENT
Start: 2025-08-26